# Patient Record
Sex: FEMALE | Race: ASIAN | NOT HISPANIC OR LATINO | Employment: FULL TIME | URBAN - METROPOLITAN AREA
[De-identification: names, ages, dates, MRNs, and addresses within clinical notes are randomized per-mention and may not be internally consistent; named-entity substitution may affect disease eponyms.]

---

## 2019-09-06 ENCOUNTER — OFFICE VISIT - HEALTHEAST (OUTPATIENT)
Dept: SURGERY | Facility: CLINIC | Age: 34
End: 2019-09-06

## 2019-09-06 ENCOUNTER — AMBULATORY - HEALTHEAST (OUTPATIENT)
Dept: LAB | Facility: CLINIC | Age: 34
End: 2019-09-06

## 2019-09-06 DIAGNOSIS — L83 ACANTHOSIS NIGRICANS: ICD-10-CM

## 2019-09-06 DIAGNOSIS — N92.6 MENSTRUAL DISORDER: ICD-10-CM

## 2019-09-06 DIAGNOSIS — E88.819 INSULIN RESISTANCE: ICD-10-CM

## 2019-09-06 DIAGNOSIS — E28.2 POLYCYSTIC OVARY SYNDROME: ICD-10-CM

## 2019-09-06 DIAGNOSIS — E66.9 OBESITY (BMI 30-39.9): ICD-10-CM

## 2019-09-06 DIAGNOSIS — M79.673 PAIN OF FOOT, UNSPECIFIED LATERALITY: ICD-10-CM

## 2019-09-06 LAB
CHOLEST SERPL-MCNC: 248 MG/DL
ERYTHROCYTE [DISTWIDTH] IN BLOOD BY AUTOMATED COUNT: 12.5 % (ref 11–14.5)
FASTING STATUS PATIENT QL REPORTED: NO
FERRITIN SERPL-MCNC: 37 NG/ML (ref 10–130)
FOLATE SERPL-MCNC: 13.8 NG/ML
HCT VFR BLD AUTO: 38.2 % (ref 35–47)
HDLC SERPL-MCNC: 64 MG/DL
HGB BLD-MCNC: 12.6 G/DL (ref 12–16)
LDLC SERPL CALC-MCNC: 158 MG/DL
MCH RBC QN AUTO: 29.6 PG (ref 27–34)
MCHC RBC AUTO-ENTMCNC: 33 G/DL (ref 32–36)
MCV RBC AUTO: 90 FL (ref 80–100)
PLATELET # BLD AUTO: 294 THOU/UL (ref 140–440)
PMV BLD AUTO: 9.2 FL (ref 8.5–12.5)
PTH-INTACT SERPL-MCNC: 56 PG/ML (ref 10–86)
RBC # BLD AUTO: 4.25 MILL/UL (ref 3.8–5.4)
TRIGL SERPL-MCNC: 130 MG/DL
TSH SERPL DL<=0.005 MIU/L-ACNC: 2.06 UIU/ML (ref 0.3–5)
VIT B12 SERPL-MCNC: 344 PG/ML (ref 213–816)
WBC: 7.9 THOU/UL (ref 4–11)

## 2019-09-06 RX ORDER — LETROZOLE 2.5 MG/1
2 TABLET, FILM COATED ORAL DAILY
Refills: 2 | Status: ON HOLD | COMMUNITY
Start: 2019-09-04 | End: 2022-04-24

## 2019-09-06 ASSESSMENT — MIFFLIN-ST. JEOR: SCORE: 1489.88

## 2019-09-08 LAB — HBA1C MFR BLD: 6 % (ref 4.2–6.1)

## 2019-09-09 ENCOUNTER — OFFICE VISIT - HEALTHEAST (OUTPATIENT)
Dept: SURGERY | Facility: CLINIC | Age: 34
End: 2019-09-09

## 2019-09-09 DIAGNOSIS — E88.819 INSULIN RESISTANCE: ICD-10-CM

## 2019-09-09 DIAGNOSIS — Z71.3 NUTRITIONAL COUNSELING: ICD-10-CM

## 2019-09-09 DIAGNOSIS — E66.9 OBESITY WITH BODY MASS INDEX OF 30.0-39.9: ICD-10-CM

## 2019-09-09 LAB — 25(OH)D3 SERPL-MCNC: 34.7 NG/ML (ref 30–80)

## 2019-09-09 ASSESSMENT — MIFFLIN-ST. JEOR: SCORE: 1471.74

## 2019-09-11 ENCOUNTER — COMMUNICATION - HEALTHEAST (OUTPATIENT)
Dept: SURGERY | Facility: CLINIC | Age: 34
End: 2019-09-11

## 2020-01-14 ENCOUNTER — COMMUNICATION - HEALTHEAST (OUTPATIENT)
Dept: SURGERY | Facility: CLINIC | Age: 35
End: 2020-01-14

## 2020-01-14 ENCOUNTER — AMBULATORY - HEALTHEAST (OUTPATIENT)
Dept: SURGERY | Facility: CLINIC | Age: 35
End: 2020-01-14

## 2020-01-14 DIAGNOSIS — E28.2 POLYCYSTIC OVARY SYNDROME: ICD-10-CM

## 2020-01-14 DIAGNOSIS — E66.9 OBESITY (BMI 30-39.9): ICD-10-CM

## 2020-01-14 DIAGNOSIS — E88.819 INSULIN RESISTANCE: ICD-10-CM

## 2020-01-15 ENCOUNTER — AMBULATORY - HEALTHEAST (OUTPATIENT)
Dept: SURGERY | Facility: CLINIC | Age: 35
End: 2020-01-15

## 2020-01-15 DIAGNOSIS — E28.2 PCOS (POLYCYSTIC OVARIAN SYNDROME): ICD-10-CM

## 2020-01-15 DIAGNOSIS — E88.819 INSULIN RESISTANCE: ICD-10-CM

## 2020-07-30 ENCOUNTER — COMMUNICATION - HEALTHEAST (OUTPATIENT)
Dept: SURGERY | Facility: CLINIC | Age: 35
End: 2020-07-30

## 2020-07-30 ENCOUNTER — AMBULATORY - HEALTHEAST (OUTPATIENT)
Dept: SURGERY | Facility: CLINIC | Age: 35
End: 2020-07-30

## 2020-07-30 ENCOUNTER — COMMUNICATION - HEALTHEAST (OUTPATIENT)
Dept: PHARMACY | Facility: CLINIC | Age: 35
End: 2020-07-30

## 2020-07-30 DIAGNOSIS — E28.2 POLYCYSTIC OVARY SYNDROME: ICD-10-CM

## 2020-07-30 DIAGNOSIS — E66.9 OBESITY (BMI 30-39.9): ICD-10-CM

## 2020-07-30 DIAGNOSIS — E88.819 INSULIN RESISTANCE: ICD-10-CM

## 2020-07-30 RX ORDER — PHENTERMINE HYDROCHLORIDE 37.5 MG/1
TABLET ORAL
Qty: 90 TABLET | Refills: 1 | Status: SHIPPED | OUTPATIENT
Start: 2020-07-30 | End: 2022-05-25

## 2021-05-27 VITALS — SYSTOLIC BLOOD PRESSURE: 115 MMHG | DIASTOLIC BLOOD PRESSURE: 81 MMHG

## 2021-06-01 NOTE — PATIENT INSTRUCTIONS - HE
[facility]    Remember to:    -Eat 3 meals a day (not 2, not 5) Chew your food well/SLOW down  -Eat your protein first  -Be a water drinker/Minize liquid calories (no regular pop, no juice) skim or 1% milk OK  -Sleep 7-8 hours each night. Address sleep if problematic  -Stress management is important. Address if problematic  -Move-8000 steps daily Muscle: maintain your muscle mass (strength training 2X/wk)  -Wheat, not white (bread, pasta, crackers, jennifer, bagels, tortillas, rice)  -Limit restaurant, cafeteria, take out, drive through to 2 times per week or less  -Minimize caffeine, alcohol, and night-time snacking  -Consider keeping a food diary (i.e. My Fitness Pal, Lose It, or other food tracker)  -Follow up with the dietitian      **Some lean proteins: chicken, turkey, tuna, salmon, crab, fish, shrimp, scallops, lobster, lean cuts of beef and pork, luncheon meats, veggie burgers, beans (black, lima, garbanzo, espinoza, kidney, refried), chile, cottage cheese, string cheese, other cheese, eggs, tofu, peanut butter, nuts, vegan crumbles, greek yogurt    Goals: No pop NO juice   Will work on decreasing eating out   Will work on increasing activity on non-work days    MEDICATIONS FOR WEIGHT LOSS    PHENTERMINE (Adipex): approved in 1959 for appetite suppression.  It has stimulant effects and cannot be used with Ritalin, Concerta, or other stimulants.  It is not addictive although it's chemically related to amphetamines.  Amphetamines are addictive. The most common side effects are dry mouth, increased energy and concentration, increased pulse, and constipation.  You should not take phentermine if you have glaucoma, hyperthyroidism, or uncontrolled/untreated hypertension.  $24-$30 for 90 tablets    PHENDIMETRAZINE (Bontril): Appetite suppressant/sympathomimetic.  Controlled substance.  Side effects and contraindications similar to phentermine.  $45-$60 for 3 month supply    TOPIRAMATE (Topamax):  Anti-seizure medication, also used to prevent migraines.  Side effects include paresthesia, glaucoma, altered concentration, attention difficulties, memory and speech problems, metabolic acidosis, depression, increase in body temperature and decrease sweating, kidney stones, and weight loss.  Do not take Topamax while taking Depakote as this can cause high ammonia levels.  You must have reliable birth control as Topamax can cause birth defects.  Discontinue slowly to avoid seizure.  Insurance usually covers Topiramate.    QSYMIA (Phentermine + Topamax):  See above information about phentermine and Topamax.  Most common side effects are paresthesia, dizziness, distortion of taste, insomnia, constipation, and dry mouth.  $150-$220 per month    LORCASERIN (Belviq):  Approved in 2012. It is a serotonin 2C receptor agonist which reduces appetite and promotes satiety.  Average weight loss in 6000 patients was 3-3.7% over placebo.  Most common side effects include headache, dizziness, fatigue, nausea, dry mouth, and constipation. Lorcaserin should be discontinued after 12 weeks if the patient does not lose 5% of their body weight.  Use caution in patients on SSRIs, triptans, bupropion, and tramadol.  $120-$215 per month    DIETHYLPROPRION (Tenuate): Sympathomimetic amine.  Appetite suppressant.  Doses 25 mg before meals or 75 mg per day.  Most common side effects are hypertension, palpitations, EKG changes, and increased seizures in epileptics.  There can be a possible adverse reaction with alcohol.  $70-$90 per 3 months    XENICAL(Orlistat) (Benny OTC): Approved in 1999.  A fat-blocker.  It reduces absorption of fat by approximately 30%.  It has beneficial effects on lipid levels.  Side effects include diarrhea, abdominal cramping, fecal incontinence, oily spotting, and flatus with discharge.  Side effects are minimized if the patient limits their dietary fat to no more than 30% of their diet.  Patients must take a  multivitamin daily to avoid vitamin D, E, A, and K deficiency.  $120 per month      CONTRAVE (naltrexone/buproprion): Approved in 2014.  It is a combination pill including an opioid receptor blocker and a long-standing antidepressant.  Most common side effects include nausea, constipation, headache, vomiting, dizziness, trouble sleeping, dry mouth, and diarrhea.  With all antidepressants watch for mood changes and suicide ideation.  Bupropion has been known to lower the seizure threshold in those prone to seizures.  It should not be used in a patient with a recent history of bulimia. It has been associated with liver damage from taking higher than recommended doses.  Do not use countrave if you have taken opioid medications or opioid street drugs in the past 7-10 days, if you are currently on opioids, methadone, or if you are pregnant.  Do not use contrave if you have recently stopped using alcohol or benzodiazepines.  Taper off contrave slowly.  Dosing: titrate up to 2 tablets twice daily of the Naltrexone 8 mg/ Buproprion 90 mg tablets.  $200 for 90 tablets    SAXENDA (Liraglutide): A daily injectable (3mg daily) medication used for type 2 diabetes. Glucagon-like peptide-1 (GLP-1) agonist. Contraindications include personal or family history of medullary thyroid cancer or MEN type 2. Acute pancreatitis has been observed in patients taking liraglutide. Liraglutide causes C-cell tumors in rats and mice. It is unknown whether liraglutide causes tumors in humans. Start at 0.6mg, increasing the dose weekly up to 3mg.     VYVANSE (Lisdexamfetamine dimesylate): a CNS stimulant used to treat ADHD. Indicated for the treatment of moderate to severe Binge Eating Disorder in Adults. Contraindicated in patients with known heart disease, structural abnormalities of the heart, serious heart arrhythmias or unexplained syncope. CNS stimulants such as vyvanse may cause manic or psychotic symptoms in patients with BPAD or  pre-existing psychosis. Use with caution in patients with Raynaud's phenomenon. Most common side effects include dry mouth, insomnia, decreased appetite, increased heart rate, jittery feeling, constipation and anxiety.

## 2021-06-01 NOTE — PROGRESS NOTES
Medical  Weight Loss Initial Diet Evaluation  Jacinta is presenting today for a new weight management nutrition consultation. Pt has had an initial appointment with Dr. Vo.  Pt desires weight loss to improve health in order to become pregnant. She works as a , which results in increased eating of fried foods and lack of meal time schedule.   Weight loss medication: Phentermine. Taking 1/2 tablet; feels light headed. BP taken at today's visit.   Weight Loss Goal: 145 lb   Nutrition Assessment:   Anthropometrics:  Pt's Initial Weight: 186 lbs  Weight: 182 lb (82.6 kg)  Weight loss from initial: 4  % Weight loss: 2.15 %    BMI:   Vitals:    09/09/19 1300   Weight: 182 lb (82.6 kg)      IBW: 111 lb   Estimated RMR ([place]-St Jeor equation): 1476 kcals   Recommended Protein Intake: 60-80 grams of protein/day  Medical History:     Patient Active Problem List   Diagnosis     Infertility associated with anovulation     Complex endometrial hyperplasia with atypia     Cervical cancer screening     Polycystic ovary syndrome     Obesity (BMI 30-39.9)     Menstrual disorder     Acanthosis nigricans     Foot pain     Insulin resistance     Diabetes: No  Nutrition History:   Food allergies: No   Food intolerances/aversions: Yes Beans   Cultural or Worship food customs: No  Weight loss history: 6-8 years ago, lost 10 lb, self imposed diet.   Biggest weight loss struggle per pt: large portions, likes rice, drinks soda/juice.    Vitamins/Mineral Supplementation: not discussed.   Dietary Recall:  Wake up time: 9:30am (when works), if not working, wakes up at 11am.   Breakfast: none   Snack:none   Lunch:1-2pm- rice,4 sausage breakfast links (20g)   Snack: ice cream/ chips   Dinner:8pm- minced chicken with lettuce wraps (20g)   Snack:  Ice cream, junk food   Overnight eating: No  Eating out (frequency/week): daily   Hydration (type/oz. per day):  Water: 16-48oz   Caffeine: coffee every other day   Carbonation:  fountain beverage coke   Juice: 16 oz/day   Alcohol : 2-3x/week   Exercise:  Routine exercise established: No     Nutrition Diagnosis (PES statement):   1. (NI-1.3)Excessive energy intake related to Food and nutrition related knowledge deficit concerning excessive energy/oral intake as evidenced by Intake of high caloric density foods/beverages (juice, soda) at meals and/or snacks; large portion; frequent grazing; Estimated intake that exceeds estimated daily energy intake;  Frequent excessive fast food or restaurant intake; and BMI 32.   Nutrition Intervention:  1. Discussed with patient how to build a meal: the importance of including a lean/low fat protein at each meal, include a source of vegetables at a minimum of lunch and dinner, and limiting carbohydrate intake to 1 serving (15 grams) per meal.  2. Placed emphasis on importance of developing a healthy eating routine, aiming for 3 meals a day and no snacks.   3. Educated on sources of lean protein, portion sizes, and the amount of grams found in each source. Recommend pt to aim for 20-30 grams of protein at each meal; 60-80 grams per day.  4. Discussed using a protein supplement as a meal replacement; provided product examples.   5.  Gave pt a meal planner chart to complete for follow up appointment.     Goals established by patient:   1. Develop meal times.  2. Work on protein intake per meal.   3. Keep food journal.   Handouts provided:  Plate Method  Meal Planner  List of Lean Protein Sources    Nutrition Monitoring/Evaluation:   Follow up:  Pt will follow up in 1 month(s) with bariatrician and 1 month(s) with dietitian.   Time spent with patient: 30 minutes  Svetlana Haney RD   ABN: Yes

## 2021-06-03 VITALS
HEIGHT: 63 IN | WEIGHT: 182 LBS | SYSTOLIC BLOOD PRESSURE: 110 MMHG | DIASTOLIC BLOOD PRESSURE: 70 MMHG | BODY MASS INDEX: 32.25 KG/M2

## 2021-06-03 VITALS
RESPIRATION RATE: 16 BRPM | HEIGHT: 63 IN | WEIGHT: 186 LBS | OXYGEN SATURATION: 100 % | SYSTOLIC BLOOD PRESSURE: 112 MMHG | DIASTOLIC BLOOD PRESSURE: 73 MMHG | BODY MASS INDEX: 32.96 KG/M2 | HEART RATE: 84 BPM

## 2021-06-16 PROBLEM — N97.0 INFERTILITY ASSOCIATED WITH ANOVULATION: Status: ACTIVE | Noted: 2018-09-13

## 2021-06-16 PROBLEM — E88.810 METABOLIC SYNDROME X: Status: ACTIVE | Noted: 2019-09-11

## 2021-06-16 PROBLEM — R73.03 PRE-DIABETES: Status: ACTIVE | Noted: 2019-09-11

## 2021-06-16 PROBLEM — Z12.4 CERVICAL CANCER SCREENING: Status: ACTIVE | Noted: 2017-11-02

## 2021-06-16 PROBLEM — E78.5 DYSLIPIDEMIA: Status: ACTIVE | Noted: 2019-09-11

## 2021-06-16 PROBLEM — N85.02 COMPLEX ENDOMETRIAL HYPERPLASIA WITH ATYPIA: Status: ACTIVE | Noted: 2018-11-01

## 2021-06-27 ENCOUNTER — HEALTH MAINTENANCE LETTER (OUTPATIENT)
Age: 36
End: 2021-06-27

## 2021-06-28 NOTE — PROGRESS NOTES
Progress Notes by Ophelia Fernandez MD at 9/6/2019  2:00 PM     Author: Ophelia Fernandez MD Service: -- Author Type: Physician    Filed: 9/13/2019 12:42 PM Encounter Date: 9/6/2019 Status: Signed    : Ophelia Fernandez MD (Physician)       BARIATRIC CONSULTATION    Impression: Jacinta Aguayo is a 34 y.o. year old female with  has a past medical history of Acanthosis nigricans, Dyslipidemia (9/11/2019), Foot pain, Insulin resistance, Menstrual disorder, Metabolic syndrome X (9/11/2019), Obesity (BMI 30-39.9), Polycystic ovary syndrome, and Pre-diabetes (9/11/2019).  Poor functional capacity and musculoskeletal disability in the setting of the abovementioned weight related co-morbidities. Her Body mass index is 33.48 kg/m ..    Plan:Goals: No pop NO juice   Will work on decreasing eating out   Will work on increasing activity on non-work days   Discussed insulin resistance and ways to manage.  We discussed [facility] including:  -eating 3 meals daily  -eating protein first  -eating slowly, chewing food well  -avoiding/limiting calorie containing beverages  -choosing wheat, not white with breads, crackers, pastas, jennifer, bagels, tortillas, rice  -limiting restaurant or cafeteria eating to twice a week or less    We discussed the importance of restorative sleep and stress management in maintaining a healthy weight.    We reviewed medications associated with weight gain.    We discussed insulin resistance and glycemic index as it relates to appetite and weight control.     We discussed the National Weight Control Registry healthy weight maintenance strategies and ways to optimize metabolism.  We discussed the importance of physical activity including cardiovascular and strength training in maintaining a healthier weight and explored viable options.    We discussed medications available for weight loss including Phentermine, Phendimetrazine, Topamax, Qsymia, Lorcaserin, Diethylproprion,  "Orlistat, Contrave, Saxenda, and Vyvanse. We discussed the risks and benefits of each. We discussed indications, contraindications, potential side effects, and estimated costs of each. Literature was provided. Jacinta understands that not using a weight loss medication is an option.   60 minutes spent with patient. >50% in counseling.      History Surrounding Consultation  Struggles with weight started at a young age  Her weight at age 17 was 120#  She has had several past supervised and unsupervised weight loss attempts  The most weight lost was: 10# becoming vegetarian  Unfortunately there was not durable weight maintenance.  History of bulimia, anorexia, or binge eating disorder? no  If Present has eating disorder been in remission at least 3 years? NA  Night time eating? no    Dietary History  Meals per day: 2  Snacks: yes  Typical Snack: chips, cookies, beef jerkey  Who does the grocery shopping? She does  Who does the cooking? She does  A typical meal includes: B: sausage, eggs, rice L: rice and fried or baked protein D: lasagne  Regular Pop: 2 cans/d on average  Juice: simply orange with chantelle,   Caffeine: few times a week sweet ones Small  Amount of restaurant eating per week: 6  Eating a the table with the TV off? At sofa    Physical Activity Patterns  Current physical activity routine includes: used to bike and ski-2 years ago    Limitations from being physically active on a regular basis includes: fatigue    She describes her general health as: poor    Past Medical History  HTN: no  Dyslipidemia: ?  SHERLEY: no  Obesity Hypoventilation: NO  DM2: no DM1: no DX: no Most recent AIC: NA  Neuropathy: right CTS  Nephropathy: no  Retinopathy: no  IFG or \"pre-DM\": no  MI: no  CVA:no  CHF: no  Heart Valves: native  Previous cardiac testing includes: no  Cancers: no  Kidney Disease: no  DVT: no  PE: no  Colitis: no  Crohn's: no  IBS: no  PUD: no  Fatty Liver: no  Abnormal LFTs: no  Hepatitis: no  Asthma: no  Bronchitis: " no  Pneumonia: no  Other Lung Problems: no  Back Pain:yes  DDD: no  Gout: no  Fibromyalgia: no  USI: no  Severe Headaches: no  Seizures: no If so, last seizure: no  Pseudotumor: no  PCOS: yes  Menstrual Irregularity: yes  Menorrhagia: occ  Infertility: yes  Thyroid problems: no  Thyroid medications: no  Glaucoma: no  HIV positive: NO  MRSA/VRE history: no  History of Blood transfusion: no  Anemia: no    Health Care Maintenance  Colonoscopy: NA  Mammogram: NA  Pap: UTD    Medications   Current Outpatient Medications   Medication Sig Dispense Refill   ? letrozole (FEMARA) 2.5 mg tablet Take 2 tablets by mouth daily.  2   ? phentermine (ADIPEX-P) 37.5 mg tablet Take 1/2 to 1 tablet in the morning. 90 tablet 0     No current facility-administered medications for this visit.      Allergies   Patient has no known allergies.  Past Surgical History  History reviewed. No pertinent surgical history.  History of problems with anesthesia: no  History of Malignant Hyperthermia: NO    Gynecological History  Menarche: 11  Regular: for 1 year  Currently: none without meds  Problems getting pregnant: yes  MD Involvement: yes If so, explanation/Diagnosis: PCOS  : 0  Para: 0  C-S: 0  Vaginal deliveries: 0  SAB:0  EAB: 0  Gestational DM: 0  Gestational HTN: 0  Preeclampsia: 0  Current Birth Control: 0    Family History  family history is not on file.    Social History  Status:   Children: none  Work Status: working [employer]      Addiction History  Smoking History:   Started smoking: age 20's Quit smoking: early 30's Total years of tobacco use: 5 yrs  Alcohol use: rare  Current or Past history of alcohol or substance abuse: no  Last used: NA  Chemical Dependency Treatment History: NA  Chemicals: NA    Psychiatric History  Diagnoses: no  Treated by: NA  Psychiatric Hospitalizations: NA  Suicide attempts: NA  ECT: NA  Panic attacks: NA  History of Abuse: NA    Palliative Medicine History  Involvement in a pain clinic:  "No    ROS  Sleep  Snoring: no  PND: no  Witnessed Apneas: no  [place]:   STOP BANG: 3/8  General  Fatigue: yes  Sleep Quality:OK maybe 6-8 hours  HEENT  Visual changes: no  Gastrointestinal  Heartburn: no  Dysphagia: no  Cardiovascular  Murmur: no  Elevated BP: no  Chest Pain with Exertion: no  Dyspnea with Exertion: yes  Palpitations: no  Lower Extremity Edema: no  Syncope: no  Pulmonary  Shortness of breath at rest: no  Snoring: yes  PND: no  Wheezing: no  CPAP use: no  Gastrointestinal  Trouble swallowing:no  Heartburn: no  HX UGI/EGD: no  Abdominal pain: no  Hematochezia: no  Urologic  Hesitancy: no  Urgency: no  Genitourinary  ED: NA  Menorrhagia: no  Dysmenorrhea: no  Neurologic  Severe headache:no  Paresthesias: right hand  Psychiatric  Moods Stable: yes  Hallucinations: no  Rheumatologic  Myalgias: yes  Arthralgias: yes  Endocrine  Polydipsia: no  Polyuria: no  Galactorrhea: no  Heat intolerance: yes  Hirsutism: yes  Musculoskeletal  Joint pain;yes  Falls: no  Use of cane, crutch or motorized scooter: no  Hematologic  Abnormal Bleeding or Clotting: no  Dermatologic  Skin Tags: yes  Striae: no  F[place]uncless: no  Acne: no  Intertrigo: no  Lower Leg ulcers: no      Physical Exam  Height: 5' 2.5\" (1.588 m) (9/9/2019  1:00 PM)  Initial Weight: 186 lbs (9/9/2019  1:00 PM)  Weight: 182 lb (82.6 kg) (9/9/2019  1:00 PM)  Weight loss from initial: 4 (9/9/2019  1:00 PM)  % Weight loss: 2.15 % (9/9/2019  1:00 PM)  BMI (Calculated): 32.7 (9/9/2019  1:00 PM)  SpO2: 100 % (9/6/2019  1:51 PM)  Waist Circumference (In): 42 Inches (9/6/2019  1:51 PM)  Hip Circumference (In): 44 Inches (9/6/2019  1:51 PM)  Neck Circumference (In): 14.5 Inches (9/6/2019  1:51 PM)  Body fat percentage: 37.8 (9/6/2019  1:51 PM)        General Appearance  No acute distress. Obesity: central  Alert: yes  Sleepy: no  HEENT  PERRLA, EOMI  Neck  Stout: 14.5 No carotid bruits  Airway: 2-3+  Cardiovascular  Rhythm regular Rate Regular  Murmur: " no  Pulmonary  [place] Score: 10  Lungs clear to ascultation  Abdomen  No rashes.   Post surgical Scars: no  Extremities:  Pitting edema: no  Palpable distal pulses: 2+  Varicose veins: no  Neurologic  Tremors: no  Psychiatric  Thought Content Organized  Mood appears stable  Endocrine  Moon Facies: NO  Dorsal Thoracic Prominence: NO  Skin tags: yes  Acanthosis nigricans: yes  Dermatologic  Intertrigo: no            Total time with patient 60 minutes, >50% in counseling and coordination of care.

## 2021-10-17 ENCOUNTER — HEALTH MAINTENANCE LETTER (OUTPATIENT)
Age: 36
End: 2021-10-17

## 2022-02-28 ENCOUNTER — MEDICAL CORRESPONDENCE (OUTPATIENT)
Dept: HEALTH INFORMATION MANAGEMENT | Facility: CLINIC | Age: 37
End: 2022-02-28
Payer: COMMERCIAL

## 2022-03-07 ENCOUNTER — TRANSFERRED RECORDS (OUTPATIENT)
Dept: HEALTH INFORMATION MANAGEMENT | Facility: CLINIC | Age: 37
End: 2022-03-07
Payer: COMMERCIAL

## 2022-03-16 ENCOUNTER — MEDICAL CORRESPONDENCE (OUTPATIENT)
Dept: HEALTH INFORMATION MANAGEMENT | Facility: CLINIC | Age: 37
End: 2022-03-16
Payer: COMMERCIAL

## 2022-03-17 ENCOUNTER — TRANSCRIBE ORDERS (OUTPATIENT)
Dept: MATERNAL FETAL MEDICINE | Facility: HOSPITAL | Age: 37
End: 2022-03-17
Payer: COMMERCIAL

## 2022-03-17 DIAGNOSIS — O26.90 PREGNANCY RELATED CONDITION, ANTEPARTUM: Primary | ICD-10-CM

## 2022-03-31 ENCOUNTER — PRE VISIT (OUTPATIENT)
Dept: MATERNAL FETAL MEDICINE | Facility: HOSPITAL | Age: 37
End: 2022-03-31
Payer: COMMERCIAL

## 2022-04-06 ENCOUNTER — OFFICE VISIT (OUTPATIENT)
Dept: MATERNAL FETAL MEDICINE | Facility: HOSPITAL | Age: 37
End: 2022-04-06
Attending: OBSTETRICS & GYNECOLOGY
Payer: COMMERCIAL

## 2022-04-06 ENCOUNTER — ANCILLARY PROCEDURE (OUTPATIENT)
Dept: ULTRASOUND IMAGING | Facility: HOSPITAL | Age: 37
End: 2022-04-06
Attending: OBSTETRICS & GYNECOLOGY
Payer: COMMERCIAL

## 2022-04-06 DIAGNOSIS — O26.90 PREGNANCY RELATED CONDITION, ANTEPARTUM: ICD-10-CM

## 2022-04-06 DIAGNOSIS — O30.042 DICHORIONIC DIAMNIOTIC TWIN PREGNANCY IN SECOND TRIMESTER: Primary | ICD-10-CM

## 2022-04-06 DIAGNOSIS — O09.812 PREGNANCY RESULTING FROM IN VITRO FERTILIZATION IN SECOND TRIMESTER: ICD-10-CM

## 2022-04-06 DIAGNOSIS — O26.872 SHORT CERVICAL LENGTH DURING PREGNANCY IN SECOND TRIMESTER: ICD-10-CM

## 2022-04-06 DIAGNOSIS — O09.522 MULTIGRAVIDA OF ADVANCED MATERNAL AGE IN SECOND TRIMESTER: ICD-10-CM

## 2022-04-06 PROCEDURE — 99207 PR NO CHARGE LOS: CPT | Performed by: OBSTETRICS & GYNECOLOGY

## 2022-04-06 PROCEDURE — 76812 OB US DETAILED ADDL FETUS: CPT

## 2022-04-06 PROCEDURE — 76811 OB US DETAILED SNGL FETUS: CPT | Mod: 26 | Performed by: OBSTETRICS & GYNECOLOGY

## 2022-04-06 PROCEDURE — 76812 OB US DETAILED ADDL FETUS: CPT | Mod: 26 | Performed by: OBSTETRICS & GYNECOLOGY

## 2022-04-06 PROCEDURE — 76817 TRANSVAGINAL US OBSTETRIC: CPT | Mod: 26 | Performed by: OBSTETRICS & GYNECOLOGY

## 2022-04-06 NOTE — PROGRESS NOTES
"Please see \"Imaging\" tab under Chart Review for full details.    Svetlana Brooke MD  Maternal Fetal Medicine    "

## 2022-04-18 ENCOUNTER — OFFICE VISIT (OUTPATIENT)
Dept: MATERNAL FETAL MEDICINE | Facility: HOSPITAL | Age: 37
End: 2022-04-18
Attending: OBSTETRICS & GYNECOLOGY
Payer: COMMERCIAL

## 2022-04-18 ENCOUNTER — ANCILLARY PROCEDURE (OUTPATIENT)
Dept: ULTRASOUND IMAGING | Facility: HOSPITAL | Age: 37
End: 2022-04-18
Attending: OBSTETRICS & GYNECOLOGY
Payer: COMMERCIAL

## 2022-04-18 DIAGNOSIS — O30.042 DICHORIONIC DIAMNIOTIC TWIN PREGNANCY IN SECOND TRIMESTER: ICD-10-CM

## 2022-04-18 DIAGNOSIS — O09.812 PREGNANCY RESULTING FROM IN VITRO FERTILIZATION IN SECOND TRIMESTER: ICD-10-CM

## 2022-04-18 DIAGNOSIS — O09.522 MULTIGRAVIDA OF ADVANCED MATERNAL AGE IN SECOND TRIMESTER: ICD-10-CM

## 2022-04-18 DIAGNOSIS — O26.872 SHORT CERVICAL LENGTH DURING PREGNANCY IN SECOND TRIMESTER: Primary | ICD-10-CM

## 2022-04-18 DIAGNOSIS — O26.872 SHORT CERVICAL LENGTH DURING PREGNANCY IN SECOND TRIMESTER: ICD-10-CM

## 2022-04-18 PROCEDURE — 76817 TRANSVAGINAL US OBSTETRIC: CPT

## 2022-04-18 PROCEDURE — 99207 PR NO CHARGE LOS: CPT | Performed by: OBSTETRICS & GYNECOLOGY

## 2022-04-18 PROCEDURE — 76817 TRANSVAGINAL US OBSTETRIC: CPT | Mod: 26 | Performed by: OBSTETRICS & GYNECOLOGY

## 2022-04-18 NOTE — NURSING NOTE
"Pt stated she has not started vagina progesterone, \"the pharmacy said they did not have it.\" This writer called [facility] in [place] and spoke with Pharmacist, Alphonso. Per pharmacist, progesterone dispensed as suppository not covered by insurance. VORB order given for vaginal progesterone, 200mg per order as Dr. Brooke on 4/6. Reviewed with Dr. Webb. Pharmacy will text patient once order is ready for .     Terrance Mack RN on 4/18/2022 at 3:35 PM      MD requesting monitoring for contractions. Tocometer placed @1552 until 1608, some uterine irritability noted, MD aware. MD checked patient's cervix. Cervix closed per MD. Education given on progesterone use as well as signs and symptoms of labor. Updated primary clinic, Dr RAMYA Carl. They will plan weekly follow up with patient in their clinic.  "

## 2022-04-23 ENCOUNTER — HOSPITAL ENCOUNTER (INPATIENT)
Facility: CLINIC | Age: 37
LOS: 1 days | Discharge: HOME | DRG: 832 | End: 2022-04-24
Attending: OBSTETRICS & GYNECOLOGY | Admitting: OBSTETRICS & GYNECOLOGY
Payer: COMMERCIAL

## 2022-04-23 PROBLEM — O47.00 PRETERM CONTRACTIONS: Status: ACTIVE | Noted: 2022-04-23

## 2022-04-23 LAB
ABO/RH(D): NORMAL
ALBUMIN UR-MCNC: NEGATIVE MG/DL
ANTIBODY SCREEN: NEGATIVE
APPEARANCE UR: CLEAR
BASOPHILS # BLD AUTO: 0 10E3/UL (ref 0–0.2)
BASOPHILS NFR BLD AUTO: 0 %
BILIRUB UR QL STRIP: NEGATIVE
CLUE CELLS: ABNORMAL
COLOR UR AUTO: NORMAL
EOSINOPHIL # BLD AUTO: 0.1 10E3/UL (ref 0–0.7)
EOSINOPHIL NFR BLD AUTO: 0 %
ERYTHROCYTE [DISTWIDTH] IN BLOOD BY AUTOMATED COUNT: 14.4 % (ref 10–15)
GLUCOSE UR STRIP-MCNC: NEGATIVE MG/DL
HCT VFR BLD AUTO: 31.7 % (ref 35–47)
HGB BLD-MCNC: 10.2 G/DL (ref 11.7–15.7)
HGB UR QL STRIP: NEGATIVE
IMM GRANULOCYTES # BLD: 0.1 10E3/UL
IMM GRANULOCYTES NFR BLD: 1 %
KETONES UR STRIP-MCNC: NEGATIVE MG/DL
LEUKOCYTE ESTERASE UR QL STRIP: NEGATIVE
LYMPHOCYTES # BLD AUTO: 3.5 10E3/UL (ref 0.8–5.3)
LYMPHOCYTES NFR BLD AUTO: 26 %
MCH RBC QN AUTO: 26.4 PG (ref 26.5–33)
MCHC RBC AUTO-ENTMCNC: 32.2 G/DL (ref 31.5–36.5)
MCV RBC AUTO: 82 FL (ref 78–100)
MONOCYTES # BLD AUTO: 0.9 10E3/UL (ref 0–1.3)
MONOCYTES NFR BLD AUTO: 7 %
NEUTROPHILS # BLD AUTO: 8.6 10E3/UL (ref 1.6–8.3)
NEUTROPHILS NFR BLD AUTO: 66 %
NITRATE UR QL: NEGATIVE
NRBC # BLD AUTO: 0 10E3/UL
NRBC BLD AUTO-RTO: 0 /100
PH UR STRIP: 6.5 [PH] (ref 5–7)
PLATELET # BLD AUTO: 180 10E3/UL (ref 150–450)
RBC # BLD AUTO: 3.87 10E6/UL (ref 3.8–5.2)
SP GR UR STRIP: 1.01 (ref 1–1.03)
SPECIMEN EXPIRATION DATE: NORMAL
T PALLIDUM AB SER QL: NONREACTIVE
TRICHOMONAS, WET PREP: ABNORMAL
UROBILINOGEN UR STRIP-MCNC: NORMAL MG/DL
WBC # BLD AUTO: 13.2 10E3/UL (ref 4–11)
WBC'S/HIGH POWER FIELD, WET PREP: ABNORMAL
YEAST, WET PREP: ABNORMAL

## 2022-04-23 PROCEDURE — 99221 1ST HOSP IP/OBS SF/LOW 40: CPT | Mod: 25 | Performed by: OBSTETRICS & GYNECOLOGY

## 2022-04-23 PROCEDURE — 87210 SMEAR WET MOUNT SALINE/INK: CPT | Performed by: STUDENT IN AN ORGANIZED HEALTH CARE EDUCATION/TRAINING PROGRAM

## 2022-04-23 PROCEDURE — 86901 BLOOD TYPING SEROLOGIC RH(D): CPT | Performed by: STUDENT IN AN ORGANIZED HEALTH CARE EDUCATION/TRAINING PROGRAM

## 2022-04-23 PROCEDURE — 120N000002 HC R&B MED SURG/OB UMMC

## 2022-04-23 PROCEDURE — 87491 CHLMYD TRACH DNA AMP PROBE: CPT | Performed by: STUDENT IN AN ORGANIZED HEALTH CARE EDUCATION/TRAINING PROGRAM

## 2022-04-23 PROCEDURE — 86780 TREPONEMA PALLIDUM: CPT | Performed by: STUDENT IN AN ORGANIZED HEALTH CARE EDUCATION/TRAINING PROGRAM

## 2022-04-23 PROCEDURE — 36415 COLL VENOUS BLD VENIPUNCTURE: CPT | Performed by: STUDENT IN AN ORGANIZED HEALTH CARE EDUCATION/TRAINING PROGRAM

## 2022-04-23 PROCEDURE — 81003 URINALYSIS AUTO W/O SCOPE: CPT | Performed by: STUDENT IN AN ORGANIZED HEALTH CARE EDUCATION/TRAINING PROGRAM

## 2022-04-23 PROCEDURE — 87653 STREP B DNA AMP PROBE: CPT | Performed by: STUDENT IN AN ORGANIZED HEALTH CARE EDUCATION/TRAINING PROGRAM

## 2022-04-23 PROCEDURE — 85025 COMPLETE CBC W/AUTO DIFF WBC: CPT | Performed by: STUDENT IN AN ORGANIZED HEALTH CARE EDUCATION/TRAINING PROGRAM

## 2022-04-23 PROCEDURE — 59025 FETAL NON-STRESS TEST: CPT | Mod: 26 | Performed by: OBSTETRICS & GYNECOLOGY

## 2022-04-23 PROCEDURE — G0463 HOSPITAL OUTPT CLINIC VISIT: HCPCS

## 2022-04-23 PROCEDURE — 250N000013 HC RX MED GY IP 250 OP 250 PS 637: Performed by: STUDENT IN AN ORGANIZED HEALTH CARE EDUCATION/TRAINING PROGRAM

## 2022-04-23 PROCEDURE — 87591 N.GONORRHOEAE DNA AMP PROB: CPT | Performed by: STUDENT IN AN ORGANIZED HEALTH CARE EDUCATION/TRAINING PROGRAM

## 2022-04-23 PROCEDURE — 250N000011 HC RX IP 250 OP 636: Performed by: OBSTETRICS & GYNECOLOGY

## 2022-04-23 RX ORDER — ONDANSETRON 2 MG/ML
4 INJECTION INTRAMUSCULAR; INTRAVENOUS EVERY 6 HOURS PRN
Status: DISCONTINUED | OUTPATIENT
Start: 2022-04-23 | End: 2022-04-24 | Stop reason: HOSPADM

## 2022-04-23 RX ORDER — PROGESTERONE 200 MG/1
200 CAPSULE ORAL AT BEDTIME
Status: DISCONTINUED | OUTPATIENT
Start: 2022-04-23 | End: 2022-04-23

## 2022-04-23 RX ORDER — PROGESTERONE 200 MG/1
200 CAPSULE ORAL AT BEDTIME
Status: DISCONTINUED | OUTPATIENT
Start: 2022-04-23 | End: 2022-04-24 | Stop reason: HOSPADM

## 2022-04-23 RX ORDER — PROCHLORPERAZINE MALEATE 10 MG
10 TABLET ORAL EVERY 6 HOURS PRN
Status: DISCONTINUED | OUTPATIENT
Start: 2022-04-23 | End: 2022-04-24 | Stop reason: HOSPADM

## 2022-04-23 RX ORDER — METOCLOPRAMIDE HYDROCHLORIDE 5 MG/ML
10 INJECTION INTRAMUSCULAR; INTRAVENOUS EVERY 6 HOURS PRN
Status: DISCONTINUED | OUTPATIENT
Start: 2022-04-23 | End: 2022-04-24 | Stop reason: HOSPADM

## 2022-04-23 RX ORDER — ACETAMINOPHEN 325 MG/1
650 TABLET ORAL EVERY 4 HOURS PRN
Status: DISCONTINUED | OUTPATIENT
Start: 2022-04-23 | End: 2022-04-24 | Stop reason: HOSPADM

## 2022-04-23 RX ORDER — ONDANSETRON 4 MG/1
4 TABLET, ORALLY DISINTEGRATING ORAL EVERY 6 HOURS PRN
Status: DISCONTINUED | OUTPATIENT
Start: 2022-04-23 | End: 2022-04-24 | Stop reason: HOSPADM

## 2022-04-23 RX ORDER — PROCHLORPERAZINE 25 MG
25 SUPPOSITORY, RECTAL RECTAL EVERY 12 HOURS PRN
Status: DISCONTINUED | OUTPATIENT
Start: 2022-04-23 | End: 2022-04-24 | Stop reason: HOSPADM

## 2022-04-23 RX ORDER — HYDROXYZINE HYDROCHLORIDE 50 MG/1
50 TABLET, FILM COATED ORAL
Status: DISCONTINUED | OUTPATIENT
Start: 2022-04-23 | End: 2022-04-24 | Stop reason: HOSPADM

## 2022-04-23 RX ORDER — BETAMETHASONE SODIUM PHOSPHATE AND BETAMETHASONE ACETATE 3; 3 MG/ML; MG/ML
12 INJECTION, SUSPENSION INTRA-ARTICULAR; INTRALESIONAL; INTRAMUSCULAR; SOFT TISSUE EVERY 24 HOURS
Status: COMPLETED | OUTPATIENT
Start: 2022-04-23 | End: 2022-04-24

## 2022-04-23 RX ORDER — METOCLOPRAMIDE 10 MG/1
10 TABLET ORAL EVERY 6 HOURS PRN
Status: DISCONTINUED | OUTPATIENT
Start: 2022-04-23 | End: 2022-04-24 | Stop reason: HOSPADM

## 2022-04-23 RX ADMIN — PROGESTERONE 200 MG: 200 CAPSULE ORAL at 20:36

## 2022-04-23 RX ADMIN — BETAMETHASONE SODIUM PHOSPHATE AND BETAMETHASONE ACETATE 12 MG: 3; 3 INJECTION, SUSPENSION INTRA-ARTICULAR; INTRALESIONAL; INTRAMUSCULAR at 10:26

## 2022-04-23 NOTE — PROVIDER NOTIFICATION
04/23/22 1413   Provider Notification   Provider Name/Title Dr. Webb   Method of Notification In Department   Notification Reason Status Update   Pt denies contractions, toco not tracing contractions. Do you still want pt on continuous Reform? Per Dr. Leon pt to be TID monitoring.

## 2022-04-23 NOTE — DISCHARGE SUMMARY
[facility] Discharge Summary    Jacinta Aguayo MRN# [id number]   Age: 37 year old YOB: 1985     Date of Admission:  2022  Date of Discharge:  22  Admitting Physician:  Clau Webb DO  Discharge Physician:  Clau Cruz     Admission Diagnosis:  - IUP at 25w1d  - Short cervix  - Di di twins  - Conception via IVF    Discharge Diagnosis:  - Same    Procedures:  Fetal and uterine monitoring    Consultations:  None    Medications prior to admission:  Medications Prior to Admission   Medication Sig Dispense Refill Last Dose     progesterone (ENDOMETRIN) 100 MG vaginal insert Place 2 suppositories (200 mg) vaginally At Bedtime 60 suppository 4 2022 at 0030     phentermine (ADIPEX-P) 37.5 mg tablet [PHENTERMINE (ADIPEX-P) 37.5 MG TABLET] Take 1/2 to 1 tablet in the morning. 90 tablet 1      [DISCONTINUED] letrozole (FEMARA) 2.5 mg tablet [LETROZOLE (FEMARA) 2.5 MG TABLET] Take 2 tablets by mouth daily.  2 More than a month at Unknown time     [DISCONTINUED] metFORMIN (GLUCOPHAGE) 500 MG tablet [METFORMIN (GLUCOPHAGE) 500 MG TABLET] Take 1 tablet (500 mg total) by mouth 2 (two) times a day with meals. 180 tablet prn          Brief History of Presentation:    Jacinta Aguayo is a 37 year old  at 25w1d by ETD who presents after she lost her mucus plug.      This happened around midnight. She denies any bleeding or leaking fluid. She reports normal fetal movement. Since losing mucus plug she has noted increased frequency of cramping, now every 15-30 minutes. Prior, she had noticed intermittent cramping for the past few days.      She denies headache, vision changes, chest pain, shortness of breath, fever, chills, nausea, vomiting or other systemic       Hospital Course:  Her cervix was finger tip on presentation. She was placed on continuous tocometry which showed no contractions. Her cervix was reassessed and was unchanged. She did receive BMZ 12mg  IM x2 due to her very high risk of  delivery. Her cervix remained unchanged. She was discharged to home on hospital day 2 with close follow up with her primary OBGYN       Discharge Instructions:  Call or present to labor and delivery if you experience:   -Regular painful contractions concerning for labor   -Leakage of fluid concerning for ruptured membranes   -Decreased fetal movement   -Bright red vaginal bleeding    -Headache, vision changes, upper abdominal pain, significant increase in swelling,   generalized unwell feeling    Follow up:  Follow up with primary OBGYN on  as previously scheduled.      Discharge Medications:  Current Discharge Medication List      CONTINUE these medications which have NOT CHANGED    Details   progesterone (ENDOMETRIN) 100 MG vaginal insert Place 2 suppositories (200 mg) vaginally At Bedtime  Qty: 60 suppository, Refills: 4    Associated Diagnoses: Short cervical length during pregnancy in second trimester      phentermine (ADIPEX-P) 37.5 mg tablet [PHENTERMINE (ADIPEX-P) 37.5 MG TABLET] Take 1/2 to 1 tablet in the morning.  Qty: 90 tablet, Refills: 1    Associated Diagnoses: Polycystic ovary syndrome; Obesity (BMI 30-39.9); Insulin resistance         STOP taking these medications       letrozole (FEMARA) 2.5 mg tablet Comments:   Reason for Stopping:         metFORMIN (GLUCOPHAGE) 500 MG tablet Comments:   Reason for Stopping:               Jailene Barksdale MD  Obstetrics and Gynecology, PGY-3  22 11:33 AM    FACULTY DISCHARGE NOTE:  I saw and examined the patient today.  See findings as documented in today's progress note. I reviewed the discharge plan with the resident, and agree with the final assessment and plan for discharge as noted in the resident's discharge summary. I personally spent 20 minutes today on discharge activities for this patient.    Clau Webb DO [facility]  Maternal Fetal Medicine Specialist  Pager: [phone]  Mobile: [phone]

## 2022-04-23 NOTE — PLAN OF CARE
US removed, continuing to monitor uterine activity with toco. EFM appropriate for gestational age, see flowsheets for details. Awaiting lab results. Patient drinking water and resting comfortably.

## 2022-04-23 NOTE — H&P
Maternal Fetal Medicine History and Physical   2022  Jacinta Aguayo  [id number]      HPI: Jacinta Aguayo is a 37 year old  at 25w1d by ETD who presents after she lost her mucus plug.     This happened around midnight. She denies any bleeding or leaking fluid. She reports normal fetal movement. Since losing mucus plug she has noted increased frequency of cramping, now every 15-30 minutes. Prior, she had noticed intermittent cramping for the past few days.     She denies headache, vision changes, chest pain, shortness of breath, fever, chills, nausea, vomiting or other systemic complaints.    Her pregnancy has been complicated by:  - Conception via IVF  - Di/di twins  - Short cervix  - Obesity    ROS: negative other than noted above    OBHX:   OB History    Para Term  AB Living   1 0 0 0 0 0   SAB IAB Ectopic Multiple Live Births   0 0 0 0 0      # Outcome Date GA Lbr Arnaldo/2nd Weight Sex Delivery Anes PTL Lv   1 Current                History reviewed. No pertinent past medical history.    History reviewed. No pertinent surgical history.    Medications:   No current facility-administered medications on file prior to encounter.  letrozole (FEMARA) 2.5 mg tablet, [LETROZOLE (FEMARA) 2.5 MG TABLET] Take 2 tablets by mouth daily.  progesterone (ENDOMETRIN) 100 MG vaginal insert, Place 2 suppositories (200 mg) vaginally At Bedtime  metFORMIN (GLUCOPHAGE) 500 MG tablet, [METFORMIN (GLUCOPHAGE) 500 MG TABLET] Take 1 tablet (500 mg total) by mouth 2 (two) times a day with meals.  phentermine (ADIPEX-P) 37.5 mg tablet, [PHENTERMINE (ADIPEX-P) 37.5 MG TABLET] Take 1/2 to 1 tablet in the morning.      No Known Allergies    History reviewed. No pertinent family history.    SocialHX:   Social History     Tobacco Use     Smoking status: Former Smoker     Quit date: 2019     Years since quittin.8     Smokeless tobacco: Never Used   Substance Use Topics     Alcohol use: Not Currently     Drug use:  Never       ROS: 10-point ROS negative except as indicated in HPI.    Physical Exam:  Vitals:    22 0350 22 0420   BP: 120/60    Resp:  16   Temp:  98.4  F (36.9  C)   TempSrc:  Oral     General: alert, oriented female, resting in bed in NAD  CV: regular rat, well perufsed  Lungs: non labored breathing, on RA  Abdomen: soft, gravid, non-tender  Extremities: bilateral lower extremities non-tender with +1 edema  SSE: copious white discharge in vagina. No pooling. No blood. Cervix appears closed  SVE: ft/50/-3  Membranes: intact    Presentation: Ceph/breech by BSUS    FHT:  A: baseline 145, moderate variability, occasional 10x10 accelerations, occasional variable decelerations  B: baseline 150, moderate variability, occasional 10x10 accelerations, rare variable  [place]: no contractions    Prenatal ultrasounds:  [facility] US () cephalic/tranverse presentation. Nl MVP x2. 6.8mm cervix.     Prenatal Labs:   Lab Results   Component Value Date    HGB 12.6 2019     GBS Status:   No results found for: GBS    No results found for: PAP    Labs: No results found for this or any previous visit (from the past 24 hour(s)).    Assessment: 37 year old  at 25w1d here for assessment of  labor in the setting of a known short cervix. Pregnancy also complicated by: di/di twins, IVF, obesity.     Plan:   contractions  Short cervix  - Slight cervical change since exam Monday  - Will repeat exam as needed  - If change, consider BMZ, magnesium, PCN, NICU  - Follow up UA, wet prep, GC/CT, GBS  - Continuous toco  - Continue vaginal progesterone    Di di twins  - TID NST    Fetal well-being  - Appropriate for gestational age    Patient seen and care plan discussed under supervision of Dr. Webb.    Jailene Barksdale MD  Obstetrics and Gyncology, PGY-3  2022, 4:29 AM      Physician Attestation   I, Clau Webb, , saw and evaluated Jacinta Aguayo with the resident.  I have reviewed and discussed  with Dr. Barksdale their history, physical and plan.    I personally reviewed the vital signs, medications, labs and imaging.    My key history or physical exam findings:   Di/Di twin gestation via IVF with known short cervix.  Patient has been on vaginal progesterone x 5 days.  Felt increase in mucus discharge early this morning with pink tinge mucus.     Cervix is visually closed, but FT dilated.  No contractions seen.  +FM and reassuring NST for gestational age x 2.     Key management decisions made by me:   -Will administer BMZ given high risk for PTD with di/di twins and short cervix  -Will tocolyze if contractions  - Reevaluate tomorrow and if no change will likely be discharged home after BMZ #2.     Clau Webb DO  Date of Service (when I saw the patient): 04/23/22    Time Spent on this Encounter   I, Clau Webb DO, spent a total of 30 minutes face to face or coordinating care of Jacinta Aguayo.  Over 50% of my time on the unit was spent counseling the patient and/or coordinating care regarding plan for administration of BMZ given high risk for PTD.

## 2022-04-23 NOTE — PLAN OF CARE
VSS. Pt denies contractions, pain, loss of fluid or mucous. EFM AGA x2, see flowsheet. First beta given today at 1030. Spouse at bedside and supportive. Continue plan of care.

## 2022-04-23 NOTE — PROVIDER NOTIFICATION
Patient arrived on unit at 0335 with partner, ambulated to room. VS and assessment WNL. EFM initiated with difficulty keeping both twins continuously on monitor. Dr. Barksdale notified of patient's arrival. Pt reports believing she may have lost her mucus plug around 0030 when she administered vaginal progesterone suppository, and has felt occasional quick pains in lower pelvis about every 15 minutes. Denies LOF, odor to discharge, uterine contractions, s/sx of pre-e. Plan per Dr. Barksdale is to run labs and start NST. Encouraging oral hydration.

## 2022-04-24 VITALS
TEMPERATURE: 98.4 F | SYSTOLIC BLOOD PRESSURE: 126 MMHG | RESPIRATION RATE: 18 BRPM | OXYGEN SATURATION: 97 % | DIASTOLIC BLOOD PRESSURE: 62 MMHG

## 2022-04-24 LAB
C TRACH DNA SPEC QL NAA+PROBE: NEGATIVE
GP B STREP DNA SPEC QL NAA+PROBE: POSITIVE
N GONORRHOEA DNA SPEC QL NAA+PROBE: NEGATIVE

## 2022-04-24 PROCEDURE — 999N000105 HC STATISTIC NO DOCUMENTATION TO SUPPORT CHARGE

## 2022-04-24 PROCEDURE — 59025 FETAL NON-STRESS TEST: CPT | Mod: 59

## 2022-04-24 PROCEDURE — 250N000011 HC RX IP 250 OP 636: Performed by: OBSTETRICS & GYNECOLOGY

## 2022-04-24 PROCEDURE — 59025 FETAL NON-STRESS TEST: CPT | Mod: 76

## 2022-04-24 RX ADMIN — BETAMETHASONE SODIUM PHOSPHATE AND BETAMETHASONE ACETATE 12 MG: 3; 3 INJECTION, SUSPENSION INTRA-ARTICULAR; INTRALESIONAL; INTRAMUSCULAR at 10:45

## 2022-04-24 NOTE — DISCHARGE INSTRUCTIONS
Discharge Instructions for Undelivered Patients    Diet:  * Drink 8 to 12 glasses of liquids (milk, juice, water) every day  * You may eat meals and snacks.    Activity:  * Count fetal kicks every day (see handout).  * Call your doctor or nurse midwife if your baby is moving less than usual.    Call your provider if you notice:  * Swelling in your face or increased swelling in your hands or legs.  * Headaches that are not relieved by Tylenol (acetaminophen).  * Changes in your vision (blurring; seeing spots or stars).  * Nausea (sick to your stomach) and vomiting (throwing up).  * Weight gain of 5 pounds per week.  * Heartburn that doesn't go away.  * Signs of bladder infection: Pain when you urinate (use the toilet), needing to go more often or more urgently.  * The bag of funez (membrane) breaks, or you notice leaking in your underwear.  * Bright red blood in your underwear.  * Abdominal (lower belly) or stomach pain.  * For first baby: Contractions (tightenings) less than 5 minutes apart for one hour or more.  * Increase or change in vaginal discharge (note the color and amount).

## 2022-04-24 NOTE — PROGRESS NOTES
Progress Note    Patient received second dose of BMZ. SVE repeated and unchanged. Plan for discharge to home with close follow up.    Jailene Barksdale MD  Obstetrics and Gynecology, PGY-3  04/24/22

## 2022-04-24 NOTE — PLAN OF CARE
Pts VS stable on this RN's shift. NSTs for baby A and B appropriate for gestational age. Pt does not have any complaints such as no LOF, no bleeding, no signs or symptoms of pre-e, and no contractions. Pts' cervix checked and found to be unchanged this shift, pt was given her second beta shot and she is stable for discharge to home.

## 2022-04-24 NOTE — PROGRESS NOTES
MFM Antepartum Progress Note    S: Patient doing well. Slept well overnight. Denies contractions, cramping or sharp pain. No discharge or vaginal bleeding. Feels babies moving normally.    O: /65 (BP Location: Right arm, Patient Position: Semi-Randhawa's, Cuff Size: Adult Regular)   Temp 98  F (36.7  C) (Oral)   Resp 16   SpO2 97%   Gen:  Resting comfortably in bed, NAD    FHT:   A: Baseline 145, moderate variability, occasional accels, occasional variable decels  B: Baseline 140, moderate variability, occasional accels, occasional variable decels    A/P: Jacinta is a 37 year old  at 25w2d admitted for  labor evaluation in the setting of known short cervix. No contractions or abdominal pain for >24 hours. Pregnancy otherwise complicated by Di/di twins, obesity    Rule out  labor  Short cervix  - Continue vaginal progesterone  - Administered second dose of BMZ this morning  - Repeat SVE this morning. If unchanged, plan discharge to home with close follow up. If change will plan 48 hours of tocolysis    Fetal well being  Di/Di twins  - Appropriate for gestational age x2  - BMZ as above    Patient seen and discussed with Dr. Webb.    Jailene Barksdale MD  Obstetrics and Gynecology, PGY-3  22 9:30 AM      Physician Attestation   IClau DO, saw and evaluated Jacinta Aguayo with the resident.      I personally reviewed the vital signs, medications, labs and imaging.    My key history or physical exam findings:   Patient is doing well.  She slept fine. No cramping or contractions.  Denies pink discharge/spotting.  +FM x 2.      Key management decisions made by me:   Plan to reassess digital cervix examination. If unchanged patient can be discharged after 2nd BMZ.     Follow up with primary ob tomorrow as planned.  Close monitoring for  labor    Clau Webb DO  Date of Service (when I saw the patient): 22    Time Spent on this Encounter   Clau MAGALLANES  Cielo Webb DO, spent a total of 15 minutes face to face or coordinating care of Jacinta Aguayo.  Over 50% of my time on the unit was spent counseling the patient and/or coordinating care regarding Di/Di twin gestation, known short cervix with minimal cervical change.

## 2022-05-08 ENCOUNTER — ANESTHESIA EVENT (OUTPATIENT)
Dept: OBGYN | Facility: CLINIC | Age: 37
End: 2022-05-08
Payer: COMMERCIAL

## 2022-05-08 ENCOUNTER — HOSPITAL ENCOUNTER (INPATIENT)
Facility: CLINIC | Age: 37
LOS: 4 days | Discharge: HOME | End: 2022-05-12
Attending: OBSTETRICS & GYNECOLOGY | Admitting: OBSTETRICS & GYNECOLOGY
Payer: COMMERCIAL

## 2022-05-08 ENCOUNTER — ANESTHESIA (OUTPATIENT)
Dept: OBGYN | Facility: CLINIC | Age: 37
End: 2022-05-08
Payer: COMMERCIAL

## 2022-05-08 DIAGNOSIS — Z98.891 S/P CESAREAN SECTION: Primary | ICD-10-CM

## 2022-05-08 PROBLEM — Z36.89 ENCOUNTER FOR TRIAGE IN PREGNANT PATIENT: Status: ACTIVE | Noted: 2022-05-08

## 2022-05-08 PROBLEM — O42.919 PRETERM PREMATURE RUPTURE OF MEMBRANES (PPROM) WITH UNKNOWN ONSET OF LABOR: Status: ACTIVE | Noted: 2022-05-08

## 2022-05-08 LAB
ABO/RH(D): NORMAL
ALBUMIN UR-MCNC: NEGATIVE MG/DL
AMORPH CRY #/AREA URNS HPF: ABNORMAL /HPF
AMPHETAMINES UR QL SCN: NORMAL
ANTIBODY SCREEN: NEGATIVE
APPEARANCE UR: ABNORMAL
BACTERIA #/AREA URNS HPF: ABNORMAL /HPF
BASOPHILS # BLD AUTO: 0 10E3/UL (ref 0–0.2)
BASOPHILS NFR BLD AUTO: 0 %
BILIRUB UR QL STRIP: NEGATIVE
CANNABINOIDS UR QL SCN: NORMAL
CLUE CELLS: ABNORMAL
COCAINE UR QL: NORMAL
COLOR UR AUTO: ABNORMAL
EOSINOPHIL # BLD AUTO: 0 10E3/UL (ref 0–0.7)
EOSINOPHIL NFR BLD AUTO: 0 %
ERYTHROCYTE [DISTWIDTH] IN BLOOD BY AUTOMATED COUNT: 15.1 % (ref 10–15)
GLUCOSE UR STRIP-MCNC: 30 MG/DL
HCT VFR BLD AUTO: 31 % (ref 35–47)
HGB BLD-MCNC: 10 G/DL (ref 11.7–15.7)
HGB UR QL STRIP: NEGATIVE
IMM GRANULOCYTES # BLD: 0.2 10E3/UL
IMM GRANULOCYTES NFR BLD: 1 %
KETONES UR STRIP-MCNC: NEGATIVE MG/DL
LEUKOCYTE ESTERASE UR QL STRIP: ABNORMAL
LYMPHOCYTES # BLD AUTO: 2.5 10E3/UL (ref 0.8–5.3)
LYMPHOCYTES NFR BLD AUTO: 17 %
MCH RBC QN AUTO: 26.1 PG (ref 26.5–33)
MCHC RBC AUTO-ENTMCNC: 32.3 G/DL (ref 31.5–36.5)
MCV RBC AUTO: 81 FL (ref 78–100)
MONOCYTES # BLD AUTO: 1.2 10E3/UL (ref 0–1.3)
MONOCYTES NFR BLD AUTO: 9 %
MUCOUS THREADS #/AREA URNS LPF: PRESENT /LPF
NEUTROPHILS # BLD AUTO: 10.4 10E3/UL (ref 1.6–8.3)
NEUTROPHILS NFR BLD AUTO: 73 %
NITRATE UR QL: NEGATIVE
NRBC # BLD AUTO: 0 10E3/UL
NRBC BLD AUTO-RTO: 0 /100
OPIATES UR QL SCN: NORMAL
PCP UR QL SCN: NORMAL
PH UR STRIP: 6 [PH] (ref 5–7)
PLATELET # BLD AUTO: 169 10E3/UL (ref 150–450)
RBC # BLD AUTO: 3.83 10E6/UL (ref 3.8–5.2)
RBC URINE: 4 /HPF
RUPTURE OF FETAL MEMBRANES BY ROM PLUS: POSITIVE
SARS-COV-2 RNA RESP QL NAA+PROBE: NEGATIVE
SP GR UR STRIP: 1.01 (ref 1–1.03)
SPECIMEN EXPIRATION DATE: NORMAL
SQUAMOUS EPITHELIAL: 5 /HPF
TRICHOMONAS, WET PREP: ABNORMAL
UROBILINOGEN UR STRIP-MCNC: NORMAL MG/DL
WBC # BLD AUTO: 14.3 10E3/UL (ref 4–11)
WBC URINE: 68 /HPF
WBC'S/HIGH POWER FIELD, WET PREP: ABNORMAL
YEAST, WET PREP: ABNORMAL

## 2022-05-08 PROCEDURE — 86850 RBC ANTIBODY SCREEN: CPT | Performed by: STUDENT IN AN ORGANIZED HEALTH CARE EDUCATION/TRAINING PROGRAM

## 2022-05-08 PROCEDURE — 250N000013 HC RX MED GY IP 250 OP 250 PS 637: Performed by: STUDENT IN AN ORGANIZED HEALTH CARE EDUCATION/TRAINING PROGRAM

## 2022-05-08 PROCEDURE — 87653 STREP B DNA AMP PROBE: CPT | Performed by: STUDENT IN AN ORGANIZED HEALTH CARE EDUCATION/TRAINING PROGRAM

## 2022-05-08 PROCEDURE — G0463 HOSPITAL OUTPT CLINIC VISIT: HCPCS | Mod: 25

## 2022-05-08 PROCEDURE — 99222 1ST HOSP IP/OBS MODERATE 55: CPT | Mod: AI | Performed by: OBSTETRICS & GYNECOLOGY

## 2022-05-08 PROCEDURE — U0003 INFECTIOUS AGENT DETECTION BY NUCLEIC ACID (DNA OR RNA); SEVERE ACUTE RESPIRATORY SYNDROME CORONAVIRUS 2 (SARS-COV-2) (CORONAVIRUS DISEASE [COVID-19]), AMPLIFIED PROBE TECHNIQUE, MAKING USE OF HIGH THROUGHPUT TECHNOLOGIES AS DESCRIBED BY CMS-2020-01-R: HCPCS | Performed by: OBSTETRICS & GYNECOLOGY

## 2022-05-08 PROCEDURE — 87210 SMEAR WET MOUNT SALINE/INK: CPT | Performed by: STUDENT IN AN ORGANIZED HEALTH CARE EDUCATION/TRAINING PROGRAM

## 2022-05-08 PROCEDURE — 80307 DRUG TEST PRSMV CHEM ANLYZR: CPT | Performed by: OBSTETRICS & GYNECOLOGY

## 2022-05-08 PROCEDURE — 85025 COMPLETE CBC W/AUTO DIFF WBC: CPT | Performed by: STUDENT IN AN ORGANIZED HEALTH CARE EDUCATION/TRAINING PROGRAM

## 2022-05-08 PROCEDURE — 120N000002 HC R&B MED SURG/OB UMMC

## 2022-05-08 PROCEDURE — 86901 BLOOD TYPING SEROLOGIC RH(D): CPT | Performed by: STUDENT IN AN ORGANIZED HEALTH CARE EDUCATION/TRAINING PROGRAM

## 2022-05-08 PROCEDURE — 87086 URINE CULTURE/COLONY COUNT: CPT | Performed by: STUDENT IN AN ORGANIZED HEALTH CARE EDUCATION/TRAINING PROGRAM

## 2022-05-08 PROCEDURE — 84112 EVAL AMNIOTIC FLUID PROTEIN: CPT | Performed by: OBSTETRICS & GYNECOLOGY

## 2022-05-08 PROCEDURE — 87491 CHLMYD TRACH DNA AMP PROBE: CPT | Performed by: STUDENT IN AN ORGANIZED HEALTH CARE EDUCATION/TRAINING PROGRAM

## 2022-05-08 PROCEDURE — 250N000011 HC RX IP 250 OP 636: Performed by: STUDENT IN AN ORGANIZED HEALTH CARE EDUCATION/TRAINING PROGRAM

## 2022-05-08 PROCEDURE — 258N000003 HC RX IP 258 OP 636: Performed by: STUDENT IN AN ORGANIZED HEALTH CARE EDUCATION/TRAINING PROGRAM

## 2022-05-08 PROCEDURE — 81001 URINALYSIS AUTO W/SCOPE: CPT | Performed by: STUDENT IN AN ORGANIZED HEALTH CARE EDUCATION/TRAINING PROGRAM

## 2022-05-08 RX ORDER — PROCHLORPERAZINE MALEATE 10 MG
10 TABLET ORAL EVERY 6 HOURS PRN
Status: DISCONTINUED | OUTPATIENT
Start: 2022-05-08 | End: 2022-05-09 | Stop reason: HOSPADM

## 2022-05-08 RX ORDER — SODIUM CHLORIDE, SODIUM LACTATE, POTASSIUM CHLORIDE, CALCIUM CHLORIDE 600; 310; 30; 20 MG/100ML; MG/100ML; MG/100ML; MG/100ML
INJECTION, SOLUTION INTRAVENOUS
Status: DISCONTINUED
Start: 2022-05-08 | End: 2022-05-08 | Stop reason: HOSPADM

## 2022-05-08 RX ORDER — AMOXICILLIN 250 MG
1 CAPSULE ORAL 2 TIMES DAILY
Status: DISCONTINUED | OUTPATIENT
Start: 2022-05-08 | End: 2022-05-09 | Stop reason: HOSPADM

## 2022-05-08 RX ORDER — AMOXICILLIN 250 MG/1
250 CAPSULE ORAL 3 TIMES DAILY
Status: DISCONTINUED | OUTPATIENT
Start: 2022-05-10 | End: 2022-05-09 | Stop reason: HOSPADM

## 2022-05-08 RX ORDER — AMOXICILLIN 250 MG
2 CAPSULE ORAL 2 TIMES DAILY
Status: DISCONTINUED | OUTPATIENT
Start: 2022-05-08 | End: 2022-05-09 | Stop reason: HOSPADM

## 2022-05-08 RX ORDER — SODIUM CHLORIDE, SODIUM LACTATE, POTASSIUM CHLORIDE, CALCIUM CHLORIDE 600; 310; 30; 20 MG/100ML; MG/100ML; MG/100ML; MG/100ML
INJECTION, SOLUTION INTRAVENOUS CONTINUOUS
Status: DISCONTINUED | OUTPATIENT
Start: 2022-05-08 | End: 2022-05-09 | Stop reason: HOSPADM

## 2022-05-08 RX ORDER — AMPICILLIN 2 G/1
2 INJECTION, POWDER, FOR SOLUTION INTRAVENOUS EVERY 6 HOURS
Status: DISCONTINUED | OUTPATIENT
Start: 2022-05-08 | End: 2022-05-09 | Stop reason: HOSPADM

## 2022-05-08 RX ORDER — MAGNESIUM SULFATE IN WATER 40 MG/ML
2 INJECTION, SOLUTION INTRAVENOUS CONTINUOUS
Status: DISCONTINUED | OUTPATIENT
Start: 2022-05-08 | End: 2022-05-09

## 2022-05-08 RX ORDER — PROCHLORPERAZINE 25 MG
25 SUPPOSITORY, RECTAL RECTAL EVERY 12 HOURS PRN
Status: DISCONTINUED | OUTPATIENT
Start: 2022-05-08 | End: 2022-05-09 | Stop reason: HOSPADM

## 2022-05-08 RX ORDER — METOCLOPRAMIDE 10 MG/1
10 TABLET ORAL EVERY 6 HOURS PRN
Status: DISCONTINUED | OUTPATIENT
Start: 2022-05-08 | End: 2022-05-09 | Stop reason: HOSPADM

## 2022-05-08 RX ORDER — BETAMETHASONE SODIUM PHOSPHATE AND BETAMETHASONE ACETATE 3; 3 MG/ML; MG/ML
12 INJECTION, SUSPENSION INTRA-ARTICULAR; INTRALESIONAL; INTRAMUSCULAR; SOFT TISSUE EVERY 24 HOURS
Status: COMPLETED | OUTPATIENT
Start: 2022-05-08 | End: 2022-05-09

## 2022-05-08 RX ORDER — LIDOCAINE 40 MG/G
CREAM TOPICAL
Status: DISCONTINUED | OUTPATIENT
Start: 2022-05-08 | End: 2022-05-08 | Stop reason: HOSPADM

## 2022-05-08 RX ORDER — MAGNESIUM SULFATE HEPTAHYDRATE 40 MG/ML
2 INJECTION, SOLUTION INTRAVENOUS ONCE
Status: COMPLETED | OUTPATIENT
Start: 2022-05-08 | End: 2022-05-09

## 2022-05-08 RX ORDER — ONDANSETRON 4 MG/1
4 TABLET, ORALLY DISINTEGRATING ORAL EVERY 6 HOURS PRN
Status: DISCONTINUED | OUTPATIENT
Start: 2022-05-08 | End: 2022-05-09 | Stop reason: HOSPADM

## 2022-05-08 RX ORDER — ONDANSETRON 2 MG/ML
4 INJECTION INTRAMUSCULAR; INTRAVENOUS EVERY 6 HOURS PRN
Status: DISCONTINUED | OUTPATIENT
Start: 2022-05-08 | End: 2022-05-09 | Stop reason: HOSPADM

## 2022-05-08 RX ORDER — POLYETHYLENE GLYCOL 3350 17 G/17G
17 POWDER, FOR SOLUTION ORAL DAILY
Status: DISCONTINUED | OUTPATIENT
Start: 2022-05-08 | End: 2022-05-09 | Stop reason: HOSPADM

## 2022-05-08 RX ORDER — AZITHROMYCIN 250 MG/1
1000 TABLET, FILM COATED ORAL ONCE
Status: COMPLETED | OUTPATIENT
Start: 2022-05-08 | End: 2022-05-08

## 2022-05-08 RX ORDER — CALCIUM GLUCONATE 94 MG/ML
1 INJECTION, SOLUTION INTRAVENOUS
Status: DISCONTINUED | OUTPATIENT
Start: 2022-05-08 | End: 2022-05-09 | Stop reason: HOSPADM

## 2022-05-08 RX ORDER — METOCLOPRAMIDE HYDROCHLORIDE 5 MG/ML
10 INJECTION INTRAMUSCULAR; INTRAVENOUS EVERY 6 HOURS PRN
Status: DISCONTINUED | OUTPATIENT
Start: 2022-05-08 | End: 2022-05-09 | Stop reason: HOSPADM

## 2022-05-08 RX ORDER — HYDROXYZINE HYDROCHLORIDE 50 MG/1
50 TABLET, FILM COATED ORAL
Status: DISCONTINUED | OUTPATIENT
Start: 2022-05-08 | End: 2022-05-09 | Stop reason: HOSPADM

## 2022-05-08 RX ORDER — SIMETHICONE 80 MG
160 TABLET,CHEWABLE ORAL EVERY 4 HOURS PRN
Status: DISCONTINUED | OUTPATIENT
Start: 2022-05-08 | End: 2022-05-09 | Stop reason: HOSPADM

## 2022-05-08 RX ORDER — NIFEDIPINE 20 MG/1
20 CAPSULE ORAL EVERY 6 HOURS
Status: DISCONTINUED | OUTPATIENT
Start: 2022-05-08 | End: 2022-05-08

## 2022-05-08 RX ORDER — MAGNESIUM SULFATE HEPTAHYDRATE 40 MG/ML
4 INJECTION, SOLUTION INTRAVENOUS ONCE
Status: COMPLETED | OUTPATIENT
Start: 2022-05-08 | End: 2022-05-08

## 2022-05-08 RX ORDER — ACETAMINOPHEN 325 MG/1
650 TABLET ORAL EVERY 4 HOURS PRN
Status: DISCONTINUED | OUTPATIENT
Start: 2022-05-08 | End: 2022-05-09 | Stop reason: HOSPADM

## 2022-05-08 RX ADMIN — AMPICILLIN 2 G: 2 INJECTION, POWDER, FOR SOLUTION INTRAVENOUS at 19:58

## 2022-05-08 RX ADMIN — AZITHROMYCIN MONOHYDRATE 1000 MG: 250 TABLET ORAL at 14:31

## 2022-05-08 RX ADMIN — SODIUM CHLORIDE, POTASSIUM CHLORIDE, SODIUM LACTATE AND CALCIUM CHLORIDE: 600; 310; 30; 20 INJECTION, SOLUTION INTRAVENOUS at 19:57

## 2022-05-08 RX ADMIN — MAGNESIUM SULFATE HEPTAHYDRATE 2 G/HR: 40 INJECTION, SOLUTION INTRAVENOUS at 15:14

## 2022-05-08 RX ADMIN — MAGNESIUM SULFATE HEPTAHYDRATE 4 G: 40 INJECTION, SOLUTION INTRAVENOUS at 14:37

## 2022-05-08 RX ADMIN — AMPICILLIN 2 G: 2 INJECTION, POWDER, FOR SOLUTION INTRAVENOUS at 14:32

## 2022-05-08 RX ADMIN — SODIUM CHLORIDE, POTASSIUM CHLORIDE, SODIUM LACTATE AND CALCIUM CHLORIDE: 600; 310; 30; 20 INJECTION, SOLUTION INTRAVENOUS at 15:14

## 2022-05-08 RX ADMIN — BETAMETHASONE SODIUM PHOSPHATE AND BETAMETHASONE ACETATE 12 MG: 3; 3 INJECTION, SUSPENSION INTRA-ARTICULAR; INTRALESIONAL; INTRAMUSCULAR at 14:52

## 2022-05-08 RX ADMIN — MAGNESIUM SULFATE 2 G: 2 INJECTION INTRAVENOUS at 15:00

## 2022-05-08 RX ADMIN — SODIUM CHLORIDE, POTASSIUM CHLORIDE, SODIUM LACTATE AND CALCIUM CHLORIDE: 600; 310; 30; 20 INJECTION, SOLUTION INTRAVENOUS at 14:04

## 2022-05-08 NOTE — PLAN OF CARE
Goal Outcome Evaluation:  Data: Patient presented to [facility] at 1145. Reason for maternal/fetal assessment per patient is Contractions  Patient is a . Prenatal record reviewed.      OB History    Para Term  AB Living   1 0 0 0 0 0   SAB IAB Ectopic Multiple Live Births   0 0 0 0 0      # Outcome Date GA Lbr Arnaldo/2nd Weight Sex Delivery Anes PTL Lv   1 Current            . Medical history: No past medical history on file.. Gestational Age 27w2d. VSS. Fetal movement present X2. Patient denies backache, vaginal discharge, pelvic pressure, UTI symptoms, GI problems, bloody show, vaginal bleeding, edema, headache, visual disturbances, epigastric or URQ pain, rupture of membranes when pt in bathroom in triage at 1250 clear large amount Support persons  present.  Action: Verbal consent for EFM. Triage assessment completed. EFM applied for monitoring. Uterine assessment 4-8 minutes apart. Fetal assessment A and B: Presumed adequate fetal oxygenation documented (see flow record).   Response: Dr. Boyd and Dr Gallardo informed of pt arrival and status. Plan per provider is admission. Patient verbalized agreement with plan.

## 2022-05-08 NOTE — H&P
Maternal Fetal Medicine   History and Physical    No LMP recorded. Patient is pregnant.  Estimated Date of Delivery: Aug 5, 2022   Gestational age:  27w2d   Obstetric History:                Assessment and Plan   Jacinta Aguayo is a 37 year old  at 27w2d by IVF dating, who presents with contractions and during her triage evaluation was noted to be grossly ruptured and was thus recommended to be admitted until delivery.     #PPROM at 1250 on 22  - Recommended admission to antepartum until delivery.   - Started on Mag 6g (1437) > 2g/hr for at least the first 12h or until   - BMZ (). Recommended a rescue course of betamethasone for FLM as she is at high risk of delivering in the next week, she is amenable to this   -D# Latency abx   - Rom + positive, wet prep nl, WBC 14.3   - UA, Ucx, GC/CT pending  - Delivery indicated with signs of labor, infection, or worsening contractions/signs of labor. Currently no contraindication for expectant management  - Discussed dynamic situation and if she has any questions just let team know   -T&S q72h    Fetal well being  Felix twins  - Both FHT are reactive and appropriate for GA - continuous monitoring.  Arthur ():A 623g, variable. 87%. B 544g, 51%; breech  US (): cephalic, transverse  - Betamethasone series  and again   - Magnesium for fetal neuroprotection   - NICU consulted.  - Continue Qshift NSTs  - Q4 week growth US: ordered for tomorrow.    Short cervix  - PTA PV progesterone     Prenatal care  - Rh pos, Antibody negative.  - Rubella immune, Hep B SAg NR, RPR neg, HIV NR, GC/CT neg.  - GCT not yet collected.  - GBS positive    Method of delivery  # section:  -Admitted to L&D for scheduled repeat  section.  - No hx prior surgeries .   -Op note remarkable for:   -Consent obtained: The risks, benefits, and alternatives of  section were discussed, including the risks of bleeding, infection, injury to surrounding  organs, fetal injury, and remote risks of hysterectomy. We discussed at this GA it would be a classical hysterotomy and that would mean that all subsequent pregnancies we would recommend delivery between 36-37w.   She consented to a blood transfusion in the event of a life threatening amount of bleeding. She had time to ask questions and agreed to proceed. Surgical consent was signed. Blood transfusion consent signed.        Discussed the above careplan with Dr. Deb Gallardo MD   OBGYN PGY-3  22 11:34 PM          History of Present Illness      Jacinta Aguayo is a 37 year old  at 27w2d by IVF dating who presents today with contractions increasing in strength and frequency since last night around midnight. She notes they have not really dissipated and rate between a 4 to an 8. She sometimes has to stop talking 2/2 pain. .She otherwise feels well. Does not feel need for tylenol at the moment but will alert us if needed. She has been taking PV progesterone for short cervix    Denies fevers, chills, headaches, vision changes/spots, chest pain, shortness of breath, upper abdominal pain,  nausea, vomiting, diarrhea, constipation, vaginal bleeding, leaking fluid from vagina.  Active fetus.      Her pregnancy is complicated by:  - IVF pregnancy   - Di/di twin pregnancy   - Threatened  livr   - GBS positive  - Shortened cervix     OB history notable for:   OB History    Para Term  AB Living   1 0 0 0 0 0   SAB IAB Ectopic Multiple Live Births   0 0 0 0 0      # Outcome Date GA Lbr Arnaldo/2nd Weight Sex Delivery Anes PTL Lv   1 Current                Prenatal Labs  Recent Labs   Lab Test 22  1400   AS Negative    Rhogam not indicated     Lab Results   Component Value Date    AS Negative 2022    CHPCRT Negative 2022    GCPCRT Negative 2022    HGB 10.0 (L) 2022    HIV negative  G/C pendig   Rubella immune    GBS Status:   No results found for:  GBS    No results found for: PAP            Past Medical History   No past medical history on file.         Past Surgical History   No past surgical history on file.         Medications     Prior to Admission Medications   Prescriptions Last Dose Informant Patient Reported? Taking?   phentermine (ADIPEX-P) 37.5 mg tablet   No No   Sig: [PHENTERMINE (ADIPEX-P) 37.5 MG TABLET] Take 1/2 to 1 tablet in the morning.   progesterone (ENDOMETRIN) 100 MG vaginal insert 2022 at 2300  No Yes   Sig: Place 2 suppositories (200 mg) vaginally At Bedtime      Facility-Administered Medications: None       No Known Allergies         Social History     Social History     Tobacco Use     Smoking status: Former Smoker     Quit date: 2019     Years since quittin.9     Smokeless tobacco: Never Used   Substance Use Topics     Alcohol use: Not Currently     Drug use: Never    Denies drug use         Review of Systems   Pertinent positives and negatives as described above in HPI           Physical Exam     Vitals:    22 2000 22 2130 22 2245 22 2250   BP:   110/58    BP Location:       Patient Position:       Cuff Size:       Resp:     Temp: 98.3  F (36.8  C) 98.3  F (36.8  C) 98.2  F (36.8  C)    TempSrc: Oral Oral Oral    SpO2:   97% 98%     Gen: Pleasant, gravid woman.  Sitting up in bed, in NAD.  Heart:: normal and well perfused   Lungs: non labored breathing on room air   Abdomen: Non-tender, Gravid  Back: no lesions, no CVA tenderness  Extremities: moves all extremities equally     SSE: normal appearing external genitalia with no lesions, speculum placed without difficulty with  visualization of just anterior lip of cervix. Copious clear fluid coming from cervix and in the vagina. Vagina with normal appearing thin white discharge in vault.  No blood in vault or coming from cervix  Cervix: /-3 (1340, 5/8)           Membranes:  Grossly ruptured         Contraction frequency: 1-2 in 10  minutes    Fetus A  FHR:  Rate 150, mod variability, accelerations present, decelerations absent      Fetus B  FHR:  Rate 140, mod variability, accelerations present, decelerations absent           Category: overall appropriate for GA x2         Laboratory/Imaging   Pending labs: Hg, Type and Screen, RPR UA, UCX, wet prep, rom plus    Fetus 1: GENERAL EVALUATION  ---------------------------------------------------------------------------------------------------------  Cardiac activity present.  bpm.  Fetal movements visualized.  Presentation cephalic, maternal left.  Placenta Placental site: anterior, thick dividing membrane.  Umbilical cord previously studied.  Amniotic fluid Amount of AF: normal. MVP 5.5 cm.        Fetus 2: GENERAL EVALUATION  ---------------------------------------------------------------------------------------------------------  Cardiac activity present.  bpm.  Fetal movements visualized.  Presentation transverse with head to maternal right, superior.  Placenta Placental site: posterior, thick dividing membrane.  Umbilical cord previously studied.  Amniotic fluid Amount of AF: normal. MVP 6.3 cm.        Fetus 1: GENERAL EVALUATION  ---------------------------------------------------------------------------------------------------------  Cardiac activity present.  bpm.  Fetal movements present.  Presentation Variable, mat left, presenting.  Placenta Placental site: anterior. No Previa, > 2 cm from internal os, thick dividing membrane.  Umbilical cord 3 vessel cord.  Amniotic fluid Amount of AF: normal. MVP 4.9 cm.        Fetus 2: GENERAL EVALUATION  ---------------------------------------------------------------------------------------------------------  Cardiac activity present.  bpm.  Fetal movements present.  Presentation breech, maternal right, superior.  Placenta Placental site: posterior, thick dividing membrane.  Umbilical cord 3 vessel cord.  Amniotic fluid  Amount of AF: normal. MVP 5.3 cm.        Fetus 1: FETAL BIOMETRY  ---------------------------------------------------------------------------------------------------------  Main Fetal Biometry:  BPD                                        58.3                    mm                         23w 6d                Hadlock  OFD                                        76.5                    mm                         23w 3d                Nicolaides  HC                                          214.0                  mm                          23w 3d                Hadlock  Cerebellum tr                            25.0                   mm                          23w 0d                Nicolaides  AC                                          191.3                  mm                          23w 6d        77%        Hadlock  Femur                                      41.8                   mm                          23w 4d                Hadlock  Humerus                                  36.7                    mm                         22w 5d                Rishabh  Fetal Weight Calculation:  EFW                                       623                     g                                     87%        Hadlock  EFW (lb,oz)                             1 lb 6                  oz  EFW by                                   Hadlock (BPD-HC-AC-FL)  EFW discordance                     12.7                    %  Head / Face / Neck Biometry:                                             8.0                     mm  CM                                          4.6                     mm  Nasal bone                               7.6                     mm        Fetus 2: FETAL BIOMETRY  ---------------------------------------------------------------------------------------------------------  Main Fetal Biometry:  BPD                                        57.0                    mm                         23w 3d                 Hadlock  OFD                                        71.2                    mm                         22w 0d                Nicolaides  HC                                          205.5                  mm                          22w 5d                Hadlock  Cerebellum tr                            26.7                   mm                          24w 2d                Nicolaides  AC                                          185.5                  mm                          23w 2d        63%        Hadlock  Femur                                      38.4                   mm                          22w 2d                Hadlock  Humerus                                  35.2                    mm                         22w 1d                Rishabh  Fetal Weight Calculation:  EFW                                       544                     g                                     51%        Hadlock  EFW (lb,oz)                             1 lb 3                  oz  EFW by                                   Hadlock (BPD-HC-AC-FL)  EFW discordance                     12.7                    %  Head / Face / Neck Biometry:  CM                                          4.1                     mm  Nasal bone                               7.4                     mm        Fetus 1: FETAL ANATOMY  ---------------------------------------------------------------------------------------------------------  The following structures appear normal:  Head / Neck                         Cranium. Head size. Head shape. Lateral ventricles. Choroid plexus. Midline falx. Cavum septi pellucidi. Cerebellum. Cisterna magna.                                             Parenchyma. Thalami. Vermis.                                             Neck. Nuchal fold.  Face                                   Lips. Profile. Nose. Maxilla. Mandible. Orbits. Lens.  Heart / Thorax                      4-chamber view. RVOT view. LVOT view. Situs. Bicaval  view. Ductal arch view. Superior vena cava. Inferior vena cava. Cardiac position. Cardiac                                             size. Cardiac rhythm.                                             Right lung. Left lung. Diaphragm.  Abdomen                             Abdominal wall. Cord insertion. Stomach. Kidneys. Bladder. Liver. Bowel. Genitals.  Spine                                  Cervical spine. Thoracic spine. Lumbar spine. Sacral spine.  Extremities / Skeleton          Left foot.     The following structures could not be adequately visualized:  Heart / Thorax                      Aortic arch view. 3-vessel view. 3-vessel-trachea view.  Extremities / Skeleton          Right hand. Left hand. Right foot.     Gender: male.        Fetus 2: FETAL ANATOMY  ---------------------------------------------------------------------------------------------------------  The following structures appear normal:  Head / Neck                         Cranium. Head size. Head shape. Lateral ventricles. Choroid plexus. Midline falx. Cavum septi pellucidi. Cerebellum. Cisterna magna.                                             Parenchyma. Thalami. Vermis.                                             Neck. Nuchal fold.  Face                                   Lips. Profile. Nose. Maxilla. Mandible. Orbits. Lens.  Heart / Thorax                      4-chamber view. RVOT view. LVOT view. Situs. Aortic arch view. Bicaval view. Ductal arch view. Superior vena cava. Inferior vena cava. 3-vessel                                             view. 3-vessel-trachea view. Cardiac position. Cardiac size. Cardiac rhythm.                                             Right lung. Left lung. Diaphragm.  Abdomen                             Abdominal wall. Cord insertion. Stomach. Kidneys. Bladder. Liver. Bowel. Genitals.  Spine                                  Cervical spine. Thoracic spine.  Extremities / Skeleton          Right foot. Left  foot.     The following structures could not be adequately visualized:  Spine                                  Lumbar spine. Sacral spine.  Extremities / Skeleton          Right hand. Left hand.     Gender: female.        MATERNAL STRUCTURES  ---------------------------------------------------------------------------------------------------------  Cervix                                  Visualized                                             Appearance: Shortened but appears Closed                                             Approach - Transvaginal: Cervical length 13.1 mm  Right Ovary                          Visualized  Left Ovary                            Visualized      Physician Attestation   I, Yi Boyd MD, saw this patient with the resident and agree with the resident/fellow's findings and plan of care as documented in the note.      I personally reviewed vital signs, medications, labs and imaging.    Key findings: 36 yo G1 at 27w 3d by IVF dating admitted for evaluation for  contractions with subsequent PPROM. Pregnancy complicated by short cervix, on progesterone therapy. We discussed management of PPROM in detail, including expectant management inpatient until delivery. The risks associated with PPROM were discussed, including the risk for imminent delivery (50% within the next week), risk for maternal or fetal infection, and  labor abruption. Recommend latency antibiotics per protocol. Complete BMZ course. Magnesium sulfate for neuroprotection, with plan to repeat if delivery again appears imminent. Tocolysis not recommended given PPROM. Plan TID fetal surveillance starting now and twice weekly BPP beginning at 28 weeks gestation.    Yi Boyd MD  Date of Service (when I saw the patient): 22

## 2022-05-09 ENCOUNTER — APPOINTMENT (OUTPATIENT)
Dept: ULTRASOUND IMAGING | Facility: CLINIC | Age: 37
End: 2022-05-09
Payer: COMMERCIAL

## 2022-05-09 LAB
APTT PPP: 27 SECONDS (ref 22–38)
D DIMER PPP FEU-MCNC: 7.84 UG/ML FEU (ref 0–0.5)
ERYTHROCYTE [DISTWIDTH] IN BLOOD BY AUTOMATED COUNT: 15.3 % (ref 10–15)
FIBRINOGEN PPP-MCNC: 630 MG/DL (ref 170–490)
GP B STREP DNA SPEC QL NAA+PROBE: POSITIVE
HCT VFR BLD AUTO: 29.6 % (ref 35–47)
HGB BLD-MCNC: 9.2 G/DL (ref 11.7–15.7)
INR PPP: 1.11 (ref 0.86–1.14)
MCH RBC QN AUTO: 26.1 PG (ref 26.5–33)
MCHC RBC AUTO-ENTMCNC: 31.1 G/DL (ref 31.5–36.5)
MCV RBC AUTO: 84 FL (ref 78–100)
PLATELET # BLD AUTO: 156 10E3/UL (ref 150–450)
RBC # BLD AUTO: 3.53 10E6/UL (ref 3.8–5.2)
WBC # BLD AUTO: 8.7 10E3/UL (ref 4–11)

## 2022-05-09 PROCEDURE — 85730 THROMBOPLASTIN TIME PARTIAL: CPT | Performed by: STUDENT IN AN ORGANIZED HEALTH CARE EDUCATION/TRAINING PROGRAM

## 2022-05-09 PROCEDURE — 93971 EXTREMITY STUDY: CPT | Mod: LT

## 2022-05-09 PROCEDURE — 271N000001 HC OR GENERAL SUPPLY NON-STERILE: Performed by: OBSTETRICS & GYNECOLOGY

## 2022-05-09 PROCEDURE — 99222 1ST HOSP IP/OBS MODERATE 55: CPT | Performed by: NURSE PRACTITIONER

## 2022-05-09 PROCEDURE — 250N000013 HC RX MED GY IP 250 OP 250 PS 637: Performed by: STUDENT IN AN ORGANIZED HEALTH CARE EDUCATION/TRAINING PROGRAM

## 2022-05-09 PROCEDURE — 76816 OB US FOLLOW-UP PER FETUS: CPT

## 2022-05-09 PROCEDURE — 86780 TREPONEMA PALLIDUM: CPT | Performed by: STUDENT IN AN ORGANIZED HEALTH CARE EDUCATION/TRAINING PROGRAM

## 2022-05-09 PROCEDURE — 250N000011 HC RX IP 250 OP 636: Performed by: STUDENT IN AN ORGANIZED HEALTH CARE EDUCATION/TRAINING PROGRAM

## 2022-05-09 PROCEDURE — 85610 PROTHROMBIN TIME: CPT | Performed by: STUDENT IN AN ORGANIZED HEALTH CARE EDUCATION/TRAINING PROGRAM

## 2022-05-09 PROCEDURE — 272N000001 HC OR GENERAL SUPPLY STERILE: Performed by: OBSTETRICS & GYNECOLOGY

## 2022-05-09 PROCEDURE — 250N000011 HC RX IP 250 OP 636

## 2022-05-09 PROCEDURE — 710N000010 HC RECOVERY PHASE 1, LEVEL 2, PER MIN: Performed by: OBSTETRICS & GYNECOLOGY

## 2022-05-09 PROCEDURE — 120N000002 HC R&B MED SURG/OB UMMC

## 2022-05-09 PROCEDURE — 370N000017 HC ANESTHESIA TECHNICAL FEE, PER MIN: Performed by: OBSTETRICS & GYNECOLOGY

## 2022-05-09 PROCEDURE — 250N000013 HC RX MED GY IP 250 OP 250 PS 637

## 2022-05-09 PROCEDURE — 258N000003 HC RX IP 258 OP 636: Performed by: STUDENT IN AN ORGANIZED HEALTH CARE EDUCATION/TRAINING PROGRAM

## 2022-05-09 PROCEDURE — C9290 INJ, BUPIVACAINE LIPOSOME: HCPCS | Performed by: ANESTHESIOLOGY

## 2022-05-09 PROCEDURE — 250N000009 HC RX 250: Performed by: STUDENT IN AN ORGANIZED HEALTH CARE EDUCATION/TRAINING PROGRAM

## 2022-05-09 PROCEDURE — 36415 COLL VENOUS BLD VENIPUNCTURE: CPT | Performed by: STUDENT IN AN ORGANIZED HEALTH CARE EDUCATION/TRAINING PROGRAM

## 2022-05-09 PROCEDURE — 59514 CESAREAN DELIVERY ONLY: CPT | Mod: 22 | Performed by: OBSTETRICS & GYNECOLOGY

## 2022-05-09 PROCEDURE — 258N000003 HC RX IP 258 OP 636: Performed by: ANESTHESIOLOGY

## 2022-05-09 PROCEDURE — 93971 EXTREMITY STUDY: CPT | Mod: 26 | Performed by: RADIOLOGY

## 2022-05-09 PROCEDURE — 85379 FIBRIN DEGRADATION QUANT: CPT | Performed by: STUDENT IN AN ORGANIZED HEALTH CARE EDUCATION/TRAINING PROGRAM

## 2022-05-09 PROCEDURE — 999N000141 HC STATISTIC PRE-PROCEDURE NURSING ASSESSMENT: Performed by: OBSTETRICS & GYNECOLOGY

## 2022-05-09 PROCEDURE — 76816 OB US FOLLOW-UP PER FETUS: CPT | Mod: 26 | Performed by: OBSTETRICS & GYNECOLOGY

## 2022-05-09 PROCEDURE — 85027 COMPLETE CBC AUTOMATED: CPT | Performed by: STUDENT IN AN ORGANIZED HEALTH CARE EDUCATION/TRAINING PROGRAM

## 2022-05-09 PROCEDURE — 250N000011 HC RX IP 250 OP 636: Performed by: ANESTHESIOLOGY

## 2022-05-09 PROCEDURE — 360N000076 HC SURGERY LEVEL 3, PER MIN: Performed by: OBSTETRICS & GYNECOLOGY

## 2022-05-09 PROCEDURE — 88307 TISSUE EXAM BY PATHOLOGIST: CPT | Mod: TC | Performed by: STUDENT IN AN ORGANIZED HEALTH CARE EDUCATION/TRAINING PROGRAM

## 2022-05-09 PROCEDURE — 88307 TISSUE EXAM BY PATHOLOGIST: CPT | Mod: 26 | Performed by: PATHOLOGY

## 2022-05-09 PROCEDURE — 85384 FIBRINOGEN ACTIVITY: CPT | Performed by: STUDENT IN AN ORGANIZED HEALTH CARE EDUCATION/TRAINING PROGRAM

## 2022-05-09 RX ORDER — DIPHENHYDRAMINE HCL 25 MG
25 CAPSULE ORAL EVERY 6 HOURS PRN
Status: DISCONTINUED | OUTPATIENT
Start: 2022-05-09 | End: 2022-05-12 | Stop reason: HOSPADM

## 2022-05-09 RX ORDER — METHYLERGONOVINE MALEATE 0.2 MG/ML
200 INJECTION INTRAVENOUS
Status: DISCONTINUED | OUTPATIENT
Start: 2022-05-09 | End: 2022-05-12 | Stop reason: HOSPADM

## 2022-05-09 RX ORDER — DIPHENHYDRAMINE HYDROCHLORIDE 50 MG/ML
25 INJECTION INTRAMUSCULAR; INTRAVENOUS EVERY 6 HOURS PRN
Status: DISCONTINUED | OUTPATIENT
Start: 2022-05-09 | End: 2022-05-12 | Stop reason: HOSPADM

## 2022-05-09 RX ORDER — ONDANSETRON 4 MG/1
4 TABLET, ORALLY DISINTEGRATING ORAL EVERY 6 HOURS PRN
Status: DISCONTINUED | OUTPATIENT
Start: 2022-05-09 | End: 2022-05-12 | Stop reason: HOSPADM

## 2022-05-09 RX ORDER — ONDANSETRON 2 MG/ML
INJECTION INTRAMUSCULAR; INTRAVENOUS PRN
Status: DISCONTINUED | OUTPATIENT
Start: 2022-05-09 | End: 2022-05-09

## 2022-05-09 RX ORDER — TRANEXAMIC ACID 10 MG/ML
1 INJECTION, SOLUTION INTRAVENOUS EVERY 30 MIN PRN
Status: DISCONTINUED | OUTPATIENT
Start: 2022-05-09 | End: 2022-05-09 | Stop reason: HOSPADM

## 2022-05-09 RX ORDER — MISOPROSTOL 200 UG/1
400 TABLET ORAL
Status: DISCONTINUED | OUTPATIENT
Start: 2022-05-09 | End: 2022-05-09 | Stop reason: HOSPADM

## 2022-05-09 RX ORDER — FENTANYL CITRATE 50 UG/ML
INJECTION, SOLUTION INTRAMUSCULAR; INTRAVENOUS PRN
Status: DISCONTINUED | OUTPATIENT
Start: 2022-05-09 | End: 2022-05-09

## 2022-05-09 RX ORDER — OXYCODONE HYDROCHLORIDE 5 MG/1
5 TABLET ORAL EVERY 4 HOURS PRN
Status: DISCONTINUED | OUTPATIENT
Start: 2022-05-09 | End: 2022-05-12 | Stop reason: HOSPADM

## 2022-05-09 RX ORDER — BUPIVACAINE HYDROCHLORIDE 7.5 MG/ML
INJECTION, SOLUTION INTRASPINAL
Status: COMPLETED | OUTPATIENT
Start: 2022-05-09 | End: 2022-05-09

## 2022-05-09 RX ORDER — CITRIC ACID/SODIUM CITRATE 334-500MG
30 SOLUTION, ORAL ORAL
Status: COMPLETED | OUTPATIENT
Start: 2022-05-09 | End: 2022-05-09

## 2022-05-09 RX ORDER — LIDOCAINE 40 MG/G
CREAM TOPICAL
Status: DISCONTINUED | OUTPATIENT
Start: 2022-05-09 | End: 2022-05-12 | Stop reason: HOSPADM

## 2022-05-09 RX ORDER — CITRIC ACID/SODIUM CITRATE 334-500MG
SOLUTION, ORAL ORAL
Status: COMPLETED
Start: 2022-05-09 | End: 2022-05-09

## 2022-05-09 RX ORDER — OXYTOCIN 10 [USP'U]/ML
10 INJECTION, SOLUTION INTRAMUSCULAR; INTRAVENOUS
Status: DISCONTINUED | OUTPATIENT
Start: 2022-05-09 | End: 2022-05-12 | Stop reason: HOSPADM

## 2022-05-09 RX ORDER — CEFAZOLIN SODIUM 1 G/3ML
INJECTION, POWDER, FOR SOLUTION INTRAMUSCULAR; INTRAVENOUS PRN
Status: DISCONTINUED | OUTPATIENT
Start: 2022-05-09 | End: 2022-05-09

## 2022-05-09 RX ORDER — NALOXONE HYDROCHLORIDE 0.4 MG/ML
0.2 INJECTION, SOLUTION INTRAMUSCULAR; INTRAVENOUS; SUBCUTANEOUS
Status: DISCONTINUED | OUTPATIENT
Start: 2022-05-09 | End: 2022-05-09

## 2022-05-09 RX ORDER — MISOPROSTOL 200 UG/1
800 TABLET ORAL
Status: DISCONTINUED | OUTPATIENT
Start: 2022-05-09 | End: 2022-05-12 | Stop reason: HOSPADM

## 2022-05-09 RX ORDER — PROCHLORPERAZINE MALEATE 10 MG
10 TABLET ORAL EVERY 6 HOURS PRN
Status: DISCONTINUED | OUTPATIENT
Start: 2022-05-09 | End: 2022-05-12 | Stop reason: HOSPADM

## 2022-05-09 RX ORDER — AZITHROMYCIN 500 MG/5ML
500 INJECTION, POWDER, LYOPHILIZED, FOR SOLUTION INTRAVENOUS
Status: DISCONTINUED | OUTPATIENT
Start: 2022-05-09 | End: 2022-05-09 | Stop reason: HOSPADM

## 2022-05-09 RX ORDER — MISOPROSTOL 200 UG/1
TABLET ORAL
Status: DISCONTINUED
Start: 2022-05-09 | End: 2022-05-09 | Stop reason: HOSPADM

## 2022-05-09 RX ORDER — MAGNESIUM SULFATE HEPTAHYDRATE 40 MG/ML
4 INJECTION, SOLUTION INTRAVENOUS ONCE
Status: COMPLETED | OUTPATIENT
Start: 2022-05-09 | End: 2022-05-09

## 2022-05-09 RX ORDER — IBUPROFEN 800 MG/1
800 TABLET, FILM COATED ORAL EVERY 6 HOURS
Status: DISCONTINUED | OUTPATIENT
Start: 2022-05-10 | End: 2022-05-12 | Stop reason: HOSPADM

## 2022-05-09 RX ORDER — NALOXONE HYDROCHLORIDE 0.4 MG/ML
0.2 INJECTION, SOLUTION INTRAMUSCULAR; INTRAVENOUS; SUBCUTANEOUS
Status: DISCONTINUED | OUTPATIENT
Start: 2022-05-09 | End: 2022-05-12 | Stop reason: HOSPADM

## 2022-05-09 RX ORDER — LIDOCAINE 40 MG/G
CREAM TOPICAL
Status: DISCONTINUED | OUTPATIENT
Start: 2022-05-09 | End: 2022-05-09 | Stop reason: HOSPADM

## 2022-05-09 RX ORDER — DEXTROSE, SODIUM CHLORIDE, SODIUM LACTATE, POTASSIUM CHLORIDE, AND CALCIUM CHLORIDE 5; .6; .31; .03; .02 G/100ML; G/100ML; G/100ML; G/100ML; G/100ML
INJECTION, SOLUTION INTRAVENOUS CONTINUOUS
Status: DISCONTINUED | OUTPATIENT
Start: 2022-05-09 | End: 2022-05-12 | Stop reason: HOSPADM

## 2022-05-09 RX ORDER — MODIFIED LANOLIN
OINTMENT (GRAM) TOPICAL
Status: DISCONTINUED | OUTPATIENT
Start: 2022-05-09 | End: 2022-05-12 | Stop reason: HOSPADM

## 2022-05-09 RX ORDER — OXYTOCIN 10 [USP'U]/ML
10 INJECTION, SOLUTION INTRAMUSCULAR; INTRAVENOUS
Status: DISCONTINUED | OUTPATIENT
Start: 2022-05-09 | End: 2022-05-09 | Stop reason: HOSPADM

## 2022-05-09 RX ORDER — BISACODYL 10 MG
10 SUPPOSITORY, RECTAL RECTAL DAILY PRN
Status: DISCONTINUED | OUTPATIENT
Start: 2022-05-11 | End: 2022-05-12 | Stop reason: HOSPADM

## 2022-05-09 RX ORDER — MAGNESIUM SULFATE HEPTAHYDRATE 40 MG/ML
INJECTION, SOLUTION INTRAVENOUS
Status: COMPLETED
Start: 2022-05-09 | End: 2022-05-09

## 2022-05-09 RX ORDER — MISOPROSTOL 200 UG/1
400 TABLET ORAL
Status: DISCONTINUED | OUTPATIENT
Start: 2022-05-09 | End: 2022-05-12 | Stop reason: HOSPADM

## 2022-05-09 RX ORDER — CEFAZOLIN SODIUM 2 G/100ML
INJECTION, SOLUTION INTRAVENOUS PRN
Status: DISCONTINUED | OUTPATIENT
Start: 2022-05-09 | End: 2022-05-09

## 2022-05-09 RX ORDER — METHYLERGONOVINE MALEATE 0.2 MG/ML
INJECTION INTRAVENOUS PRN
Status: DISCONTINUED | OUTPATIENT
Start: 2022-05-09 | End: 2022-05-09

## 2022-05-09 RX ORDER — FENTANYL CITRATE-0.9 % NACL/PF 10 MCG/ML
PLASTIC BAG, INJECTION (ML) INTRAVENOUS CONTINUOUS PRN
Status: DISCONTINUED | OUTPATIENT
Start: 2022-05-09 | End: 2022-05-09

## 2022-05-09 RX ORDER — CITRIC ACID/SODIUM CITRATE 334-500MG
SOLUTION, ORAL ORAL
Status: DISCONTINUED
Start: 2022-05-09 | End: 2022-05-09 | Stop reason: HOSPADM

## 2022-05-09 RX ORDER — KETOROLAC TROMETHAMINE 30 MG/ML
30 INJECTION, SOLUTION INTRAMUSCULAR; INTRAVENOUS EVERY 6 HOURS
Status: COMPLETED | OUTPATIENT
Start: 2022-05-10 | End: 2022-05-10

## 2022-05-09 RX ORDER — FENTANYL CITRATE 50 UG/ML
25 INJECTION, SOLUTION INTRAMUSCULAR; INTRAVENOUS EVERY 5 MIN PRN
Status: DISCONTINUED | OUTPATIENT
Start: 2022-05-09 | End: 2022-05-09 | Stop reason: HOSPADM

## 2022-05-09 RX ORDER — OXYTOCIN/0.9 % SODIUM CHLORIDE 30/500 ML
340 PLASTIC BAG, INJECTION (ML) INTRAVENOUS CONTINUOUS PRN
Status: DISCONTINUED | OUTPATIENT
Start: 2022-05-09 | End: 2022-05-12 | Stop reason: HOSPADM

## 2022-05-09 RX ORDER — HYDROMORPHONE HCL IN WATER/PF 6 MG/30 ML
0.2 PATIENT CONTROLLED ANALGESIA SYRINGE INTRAVENOUS EVERY 5 MIN PRN
Status: DISCONTINUED | OUTPATIENT
Start: 2022-05-09 | End: 2022-05-09 | Stop reason: HOSPADM

## 2022-05-09 RX ORDER — ACETAMINOPHEN 325 MG/1
975 TABLET ORAL EVERY 6 HOURS
Status: DISCONTINUED | OUTPATIENT
Start: 2022-05-09 | End: 2022-05-12 | Stop reason: HOSPADM

## 2022-05-09 RX ORDER — NALOXONE HYDROCHLORIDE 0.4 MG/ML
0.4 INJECTION, SOLUTION INTRAMUSCULAR; INTRAVENOUS; SUBCUTANEOUS
Status: DISCONTINUED | OUTPATIENT
Start: 2022-05-09 | End: 2022-05-09

## 2022-05-09 RX ORDER — KETOROLAC TROMETHAMINE 30 MG/ML
INJECTION, SOLUTION INTRAMUSCULAR; INTRAVENOUS PRN
Status: DISCONTINUED | OUTPATIENT
Start: 2022-05-09 | End: 2022-05-09

## 2022-05-09 RX ORDER — FENTANYL CITRATE-0.9 % NACL/PF 10 MCG/ML
100 PLASTIC BAG, INJECTION (ML) INTRAVENOUS EVERY 5 MIN PRN
Status: DISCONTINUED | OUTPATIENT
Start: 2022-05-09 | End: 2022-05-09 | Stop reason: HOSPADM

## 2022-05-09 RX ORDER — HYDROCORTISONE 2.5 %
CREAM (GRAM) TOPICAL 3 TIMES DAILY PRN
Status: DISCONTINUED | OUTPATIENT
Start: 2022-05-09 | End: 2022-05-12 | Stop reason: HOSPADM

## 2022-05-09 RX ORDER — ONDANSETRON 2 MG/ML
4 INJECTION INTRAMUSCULAR; INTRAVENOUS EVERY 6 HOURS PRN
Status: DISCONTINUED | OUTPATIENT
Start: 2022-05-09 | End: 2022-05-12 | Stop reason: HOSPADM

## 2022-05-09 RX ORDER — AMOXICILLIN 250 MG
1 CAPSULE ORAL 2 TIMES DAILY
Status: DISCONTINUED | OUTPATIENT
Start: 2022-05-09 | End: 2022-05-12 | Stop reason: HOSPADM

## 2022-05-09 RX ORDER — FENTANYL CITRATE 50 UG/ML
INJECTION, SOLUTION INTRAMUSCULAR; INTRAVENOUS
Status: COMPLETED | OUTPATIENT
Start: 2022-05-09 | End: 2022-05-09

## 2022-05-09 RX ORDER — NALBUPHINE HYDROCHLORIDE 20 MG/ML
2.5-5 INJECTION, SOLUTION INTRAMUSCULAR; INTRAVENOUS; SUBCUTANEOUS EVERY 6 HOURS PRN
Status: DISCONTINUED | OUTPATIENT
Start: 2022-05-09 | End: 2022-05-09

## 2022-05-09 RX ORDER — METHYLERGONOVINE MALEATE 0.2 MG/ML
200 INJECTION INTRAVENOUS
Status: DISCONTINUED | OUTPATIENT
Start: 2022-05-09 | End: 2022-05-09 | Stop reason: HOSPADM

## 2022-05-09 RX ORDER — BUPIVACAINE HYDROCHLORIDE 2.5 MG/ML
INJECTION, SOLUTION EPIDURAL; INFILTRATION; INTRACAUDAL
Status: COMPLETED | OUTPATIENT
Start: 2022-05-09 | End: 2022-05-09

## 2022-05-09 RX ORDER — SODIUM CHLORIDE, SODIUM LACTATE, POTASSIUM CHLORIDE, CALCIUM CHLORIDE 600; 310; 30; 20 MG/100ML; MG/100ML; MG/100ML; MG/100ML
INJECTION, SOLUTION INTRAVENOUS CONTINUOUS
Status: DISCONTINUED | OUTPATIENT
Start: 2022-05-09 | End: 2022-05-09 | Stop reason: HOSPADM

## 2022-05-09 RX ORDER — NALOXONE HYDROCHLORIDE 0.4 MG/ML
0.4 INJECTION, SOLUTION INTRAMUSCULAR; INTRAVENOUS; SUBCUTANEOUS
Status: DISCONTINUED | OUTPATIENT
Start: 2022-05-09 | End: 2022-05-12 | Stop reason: HOSPADM

## 2022-05-09 RX ORDER — CARBOPROST TROMETHAMINE 250 UG/ML
INJECTION, SOLUTION INTRAMUSCULAR PRN
Status: DISCONTINUED | OUTPATIENT
Start: 2022-05-09 | End: 2022-05-09

## 2022-05-09 RX ORDER — OXYTOCIN/0.9 % SODIUM CHLORIDE 30/500 ML
100-340 PLASTIC BAG, INJECTION (ML) INTRAVENOUS CONTINUOUS PRN
Status: DISCONTINUED | OUTPATIENT
Start: 2022-05-09 | End: 2022-05-12 | Stop reason: HOSPADM

## 2022-05-09 RX ORDER — FENTANYL CITRATE 50 UG/ML
20 INJECTION, SOLUTION INTRAMUSCULAR; INTRAVENOUS ONCE
Status: DISCONTINUED | OUTPATIENT
Start: 2022-05-09 | End: 2022-05-09 | Stop reason: HOSPADM

## 2022-05-09 RX ORDER — TRANEXAMIC ACID 10 MG/ML
1 INJECTION, SOLUTION INTRAVENOUS EVERY 30 MIN PRN
Status: DISCONTINUED | OUTPATIENT
Start: 2022-05-09 | End: 2022-05-12 | Stop reason: HOSPADM

## 2022-05-09 RX ORDER — ONDANSETRON 4 MG/1
4 TABLET, ORALLY DISINTEGRATING ORAL EVERY 30 MIN PRN
Status: DISCONTINUED | OUTPATIENT
Start: 2022-05-09 | End: 2022-05-09 | Stop reason: HOSPADM

## 2022-05-09 RX ORDER — PROCHLORPERAZINE 25 MG
25 SUPPOSITORY, RECTAL RECTAL EVERY 12 HOURS PRN
Status: DISCONTINUED | OUTPATIENT
Start: 2022-05-09 | End: 2022-05-12 | Stop reason: HOSPADM

## 2022-05-09 RX ORDER — SIMETHICONE 80 MG
80 TABLET,CHEWABLE ORAL 4 TIMES DAILY PRN
Status: DISCONTINUED | OUTPATIENT
Start: 2022-05-09 | End: 2022-05-12 | Stop reason: HOSPADM

## 2022-05-09 RX ORDER — ONDANSETRON 2 MG/ML
4 INJECTION INTRAMUSCULAR; INTRAVENOUS EVERY 30 MIN PRN
Status: DISCONTINUED | OUTPATIENT
Start: 2022-05-09 | End: 2022-05-09 | Stop reason: HOSPADM

## 2022-05-09 RX ORDER — AMOXICILLIN 250 MG
2 CAPSULE ORAL 2 TIMES DAILY
Status: DISCONTINUED | OUTPATIENT
Start: 2022-05-09 | End: 2022-05-12 | Stop reason: HOSPADM

## 2022-05-09 RX ORDER — SODIUM CHLORIDE, SODIUM LACTATE, POTASSIUM CHLORIDE, CALCIUM CHLORIDE 600; 310; 30; 20 MG/100ML; MG/100ML; MG/100ML; MG/100ML
INJECTION, SOLUTION INTRAVENOUS CONTINUOUS PRN
Status: DISCONTINUED | OUTPATIENT
Start: 2022-05-09 | End: 2022-05-09

## 2022-05-09 RX ORDER — MAGNESIUM SULFATE HEPTAHYDRATE 40 MG/ML
2 INJECTION, SOLUTION INTRAVENOUS ONCE
Status: COMPLETED | OUTPATIENT
Start: 2022-05-09 | End: 2022-05-09

## 2022-05-09 RX ORDER — ACETAMINOPHEN 325 MG/1
975 TABLET ORAL ONCE
Status: COMPLETED | OUTPATIENT
Start: 2022-05-09 | End: 2022-05-09

## 2022-05-09 RX ORDER — OXYTOCIN/0.9 % SODIUM CHLORIDE 30/500 ML
340 PLASTIC BAG, INJECTION (ML) INTRAVENOUS CONTINUOUS PRN
Status: DISCONTINUED | OUTPATIENT
Start: 2022-05-09 | End: 2022-05-09 | Stop reason: HOSPADM

## 2022-05-09 RX ORDER — MORPHINE SULFATE 0.5 MG/ML
150 INJECTION, SOLUTION EPIDURAL; INTRATHECAL; INTRAVENOUS ONCE
Status: COMPLETED | OUTPATIENT
Start: 2022-05-09 | End: 2022-05-09

## 2022-05-09 RX ORDER — OXYTOCIN/0.9 % SODIUM CHLORIDE 30/500 ML
PLASTIC BAG, INJECTION (ML) INTRAVENOUS CONTINUOUS PRN
Status: DISCONTINUED | OUTPATIENT
Start: 2022-05-09 | End: 2022-05-09

## 2022-05-09 RX ORDER — CARBOPROST TROMETHAMINE 250 UG/ML
250 INJECTION, SOLUTION INTRAMUSCULAR
Status: DISCONTINUED | OUTPATIENT
Start: 2022-05-09 | End: 2022-05-12 | Stop reason: HOSPADM

## 2022-05-09 RX ORDER — METOCLOPRAMIDE HYDROCHLORIDE 5 MG/ML
10 INJECTION INTRAMUSCULAR; INTRAVENOUS EVERY 6 HOURS PRN
Status: DISCONTINUED | OUTPATIENT
Start: 2022-05-09 | End: 2022-05-12 | Stop reason: HOSPADM

## 2022-05-09 RX ORDER — CEFAZOLIN SODIUM 2 G/100ML
2 INJECTION, SOLUTION INTRAVENOUS
Status: DISCONTINUED | OUTPATIENT
Start: 2022-05-09 | End: 2022-05-09 | Stop reason: HOSPADM

## 2022-05-09 RX ORDER — CARBOPROST TROMETHAMINE 250 UG/ML
250 INJECTION, SOLUTION INTRAMUSCULAR
Status: DISCONTINUED | OUTPATIENT
Start: 2022-05-09 | End: 2022-05-09 | Stop reason: HOSPADM

## 2022-05-09 RX ORDER — METOCLOPRAMIDE 10 MG/1
10 TABLET ORAL EVERY 6 HOURS PRN
Status: DISCONTINUED | OUTPATIENT
Start: 2022-05-09 | End: 2022-05-12 | Stop reason: HOSPADM

## 2022-05-09 RX ORDER — MISOPROSTOL 200 UG/1
800 TABLET ORAL
Status: DISCONTINUED | OUTPATIENT
Start: 2022-05-09 | End: 2022-05-09 | Stop reason: HOSPADM

## 2022-05-09 RX ORDER — MORPHINE SULFATE 1 MG/ML
INJECTION, SOLUTION EPIDURAL; INTRATHECAL; INTRAVENOUS
Status: COMPLETED | OUTPATIENT
Start: 2022-05-09 | End: 2022-05-09

## 2022-05-09 RX ORDER — CEFAZOLIN SODIUM 2 G/100ML
2 INJECTION, SOLUTION INTRAVENOUS SEE ADMIN INSTRUCTIONS
Status: DISCONTINUED | OUTPATIENT
Start: 2022-05-09 | End: 2022-05-09 | Stop reason: HOSPADM

## 2022-05-09 RX ADMIN — SODIUM CHLORIDE, POTASSIUM CHLORIDE, SODIUM LACTATE AND CALCIUM CHLORIDE: 600; 310; 30; 20 INJECTION, SOLUTION INTRAVENOUS at 21:44

## 2022-05-09 RX ADMIN — SODIUM CHLORIDE, POTASSIUM CHLORIDE, SODIUM LACTATE AND CALCIUM CHLORIDE: 600; 310; 30; 20 INJECTION, SOLUTION INTRAVENOUS at 19:17

## 2022-05-09 RX ADMIN — MORPHINE SULFATE 0.15 MG: 1 INJECTION EPIDURAL; INTRATHECAL; INTRAVENOUS at 19:22

## 2022-05-09 RX ADMIN — Medication 30 ML: at 18:53

## 2022-05-09 RX ADMIN — AMPICILLIN 2 G: 2 INJECTION, POWDER, FOR SOLUTION INTRAVENOUS at 08:45

## 2022-05-09 RX ADMIN — BUPIVACAINE HYDROCHLORIDE IN DEXTROSE 1.6 ML: 7.5 INJECTION, SOLUTION SUBARACHNOID at 19:22

## 2022-05-09 RX ADMIN — Medication 2 G: at 19:14

## 2022-05-09 RX ADMIN — BUPIVACAINE 20 ML: 13.3 INJECTION, SUSPENSION, LIPOSOMAL INFILTRATION at 20:35

## 2022-05-09 RX ADMIN — METHYLERGONOVINE MALEATE 200 MCG: 0.2 INJECTION INTRAVENOUS at 19:50

## 2022-05-09 RX ADMIN — AMPICILLIN 2 G: 2 INJECTION, POWDER, FOR SOLUTION INTRAVENOUS at 02:26

## 2022-05-09 RX ADMIN — AZITHROMYCIN MONOHYDRATE 500 MG: 500 INJECTION, POWDER, LYOPHILIZED, FOR SOLUTION INTRAVENOUS at 19:28

## 2022-05-09 RX ADMIN — CARBOPROST TROMETHAMINE 250 MCG: 250 INJECTION, SOLUTION INTRAMUSCULAR at 20:03

## 2022-05-09 RX ADMIN — POLYETHYLENE GLYCOL 3350 17 G: 17 POWDER, FOR SOLUTION ORAL at 08:49

## 2022-05-09 RX ADMIN — AMPICILLIN 2 G: 2 INJECTION, POWDER, FOR SOLUTION INTRAVENOUS at 14:50

## 2022-05-09 RX ADMIN — FENTANYL CITRATE 20 MCG: 50 INJECTION, SOLUTION INTRAMUSCULAR; INTRAVENOUS at 19:22

## 2022-05-09 RX ADMIN — MAGNESIUM SULFATE HEPTAHYDRATE 4 G: 40 INJECTION, SOLUTION INTRAVENOUS at 18:21

## 2022-05-09 RX ADMIN — MAGNESIUM SULFATE 2 G: 2 INJECTION INTRAVENOUS at 18:43

## 2022-05-09 RX ADMIN — SENNOSIDES AND DOCUSATE SODIUM 1 TABLET: 50; 8.6 TABLET ORAL at 08:49

## 2022-05-09 RX ADMIN — BUPIVACAINE HYDROCHLORIDE 20 ML: 2.5 INJECTION, SOLUTION EPIDURAL; INFILTRATION; INTRACAUDAL at 20:35

## 2022-05-09 RX ADMIN — ONDANSETRON 4 MG: 2 INJECTION INTRAMUSCULAR; INTRAVENOUS at 19:32

## 2022-05-09 RX ADMIN — CEFAZOLIN 1 G: 1 INJECTION, POWDER, FOR SOLUTION INTRAMUSCULAR; INTRAVENOUS at 19:59

## 2022-05-09 RX ADMIN — MAGNESIUM SULFATE HEPTAHYDRATE 2 G: 40 INJECTION, SOLUTION INTRAVENOUS at 18:43

## 2022-05-09 RX ADMIN — CEFAZOLIN 1 G: 1 INJECTION, POWDER, FOR SOLUTION INTRAMUSCULAR; INTRAVENOUS at 19:58

## 2022-05-09 RX ADMIN — TRANEXAMIC ACID 1 G: 1 INJECTION, SOLUTION INTRAVENOUS at 19:44

## 2022-05-09 RX ADMIN — FENTANYL CITRATE 25 MCG: 50 INJECTION, SOLUTION INTRAMUSCULAR; INTRAVENOUS at 20:07

## 2022-05-09 RX ADMIN — MORPHINE SULFATE 150 MCG: 0.5 INJECTION, SOLUTION EPIDURAL; INTRATHECAL; INTRAVENOUS at 20:58

## 2022-05-09 RX ADMIN — Medication 50 MCG/MIN: at 19:14

## 2022-05-09 RX ADMIN — KETOROLAC TROMETHAMINE 30 MG: 30 INJECTION, SOLUTION INTRAMUSCULAR at 20:26

## 2022-05-09 RX ADMIN — SODIUM CITRATE AND CITRIC ACID MONOHYDRATE 30 ML: 500; 334 SOLUTION ORAL at 18:53

## 2022-05-09 RX ADMIN — OXYTOCIN-SODIUM CHLORIDE 0.9% IV SOLN 30 UNIT/500ML 600 ML/HR: 30-0.9/5 SOLUTION at 19:40

## 2022-05-09 RX ADMIN — BETAMETHASONE SODIUM PHOSPHATE AND BETAMETHASONE ACETATE 12 MG: 3; 3 INJECTION, SUSPENSION INTRA-ARTICULAR; INTRALESIONAL; INTRAMUSCULAR at 14:52

## 2022-05-09 RX ADMIN — ACETAMINOPHEN 975 MG: 325 TABLET ORAL at 18:58

## 2022-05-09 NOTE — ANESTHESIA PREPROCEDURE EVALUATION
Anesthesia Pre-Procedure Evaluation    Patient: Jacinta Aguayo   MRN: [id number] : 1985        Procedure : Procedure(s):   SECTION          No past medical history on file.   No past surgical history on file.   No Known Allergies   Social History     Tobacco Use     Smoking status: Former Smoker     Quit date: 2019     Years since quittin.9     Smokeless tobacco: Never Used   Substance Use Topics     Alcohol use: Not Currently      Wt Readings from Last 1 Encounters:   19 82.6 kg (182 lb)        Anesthesia Evaluation            ROS/MED HX  ENT/Pulmonary:  - neg pulmonary ROS     Neurologic:       Cardiovascular: Comment: HLD      METS/Exercise Tolerance:     Hematologic:       Musculoskeletal:       GI/Hepatic:  - neg GI/hepatic ROS     Renal/Genitourinary:       Endo:     (+) Obesity,     Psychiatric/Substance Use:  - neg psychiatric ROS     Infectious Disease:       Malignancy:       Other:   27w3d   PPROM   (+) , twin IUP,  (-) previous  and TOLAC candidate       Physical Exam    Airway        Mallampati: II   TM distance: > 3 FB   Neck ROM: full   Mouth opening: > 3 cm    Respiratory Devices and Support         Dental  no notable dental history         Cardiovascular   cardiovascular exam normal          Pulmonary   pulmonary exam normal                OUTSIDE LABS:  CBC:   Lab Results   Component Value Date    WBC 14.3 (H) 2022    WBC 13.2 (H) 2022    HGB 10.0 (L) 2022    HGB 10.2 (L) 2022    HCT 31.0 (L) 2022    HCT 31.7 (L) 2022     2022     2022     BMP: No results found for: NA, POTASSIUM, CHLORIDE, CO2, BUN, CR, GLC  COAGS: No results found for: PTT, INR, FIBR  POC: No results found for: BGM, HCG, HCGS  HEPATIC: No results found for: ALBUMIN, PROTTOTAL, ALT, AST, GGT, ALKPHOS, BILITOTAL, BILIDIRECT, LIDA  OTHER:   Lab Results   Component Value Date    A1C 6.0 2019    TSH 2.06 2019        Anesthesia Plan    ASA Status:  2   NPO Status:  ELEVATED Aspiration Risk/Unknown    Anesthesia Type: Spinal.              Consents         - Extended Intubation/Ventilatory Support Discussed: No.      - Patient is DNR/DNI Status: No    Use of blood products discussed: No .     Postoperative Care    Pain management: Multi-modal analgesia.   PONV prophylaxis: Ondansetron (or other 5HT-3)     Comments:                Trino López

## 2022-05-09 NOTE — PLAN OF CARE
Goal Outcome Evaluation:      Continues to lose a small amount of clear amniotic fluid. Uterus non tender. VSS and afebrile. Having occasional contractions, 3 in 1 hour during EFM session. Twin A FHT's 145 with moderate variability and accelerations. No decelerations note. Twin B FHT's 135 with moderate variability and accelerations and several variable decelerations. This morning complained of cramping in her left calf and stated that she has been walking with a limp. Negative Homans sign. To US to rule out DVT. SW here for consult and NNP plans to come this afternoon. Stable.

## 2022-05-09 NOTE — CONSULTS
"[facility]  MATERNAL CHILD HEALTH   INITIAL NICU PSYCHOSOCIAL ASSESSMENT     DATA:     Presenting Information: Pt, Jacinta, is a 37 year old  at 27w3d by IVF dating admitted for PPROM. She delivered di/di twins, male and female, on . SW was consulted for antepartum assessment on  prior to Jacinta's delivery.     Living Situation: Parents, Jacinta and Ricardo, are  and live together in [place], [place]. Jacinta reports they recently bought a house here.    Social Support: Jacinta shares they have much family support at this time. She reports having a large family, most of whom live in the area. She reports Alex's family lives in [place] but are also supportive.     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 Due to the nature of her job, she is unable to work remotely and anticipates she will have to quit. Jacinta reports she will not be returning to work following the birth of her babies and plans to stay at home full time to care for them. 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 She shares this is a relatively new business. Ricardo is a full time  working 12 hour shifts from 3pm-3am. Jacinta reports he is off Friday- but has recently been working overtime for extra money.     Insurance: [employer], through Ricardo's work.     Transportation: Stable.     Source of Financial Support: parental employment. Jacinta reports she was unable to qualify for WIC as they were just over the financial cut off. She endorses finances will likely be a stressor due to her inability to work.     Mental Health History: Jacinta denies any significant concerns with her mental health, mood, or coping.     Diagnoses: None reported.    Medications: No medications at this time.    Therapy: Not currently in therapy. "     History of Postpartum Mood Disorders: This is Jacinta's first pregnancy.     Chemical Health History: Jacinta denies a history of chemical health concerns. None noted in the chart.     Legal/Child Protection Involvement: None reported.     INTERVENTION:       Chart review    Collaboration with team: Spoke with MAUREEN Marquez.     Conducted Psychosocial Assessment    Introduction to Maternal Child Health SW role and scope of practice    Orientation to the NICU (parking, lodging, meals, visitation)    Validated emotions and provided supportive listening    Provided psychoeducation on  mood disorders and indicated that SW would continue to monitor mood and support bridging to mental health resources as needed.    Provided resources and referrals    parking pass- one month.     Provided SW contact info    ASSESSMENT:     Coping: Sw met with Jacinta on the antepartum unit on  and followed up with both parents on the  family care center on 5/10. Parents appeared to be coping well at this time and shared they were able to visit their babies in the NICU. Jacnita reports it was unexpected that she went into labor so soon as she was anticipating a longer stay on the antepartum unit. Family did not endorse initial questions or concerns about the NICU. Jacinta shares she was able to ask questions the day prior during her NICU consult.     Assessment of parental risk for PMAD: Higher than average risk, considering medically complexity, anticipated length of NICU stay, and birth of multiples for first time parents.     Risk Factors: first time parents, limited financial resources, birth of multiples, hospitalization during a global pandemic and unexpected hospitalization     Resiliency Factors & Strengths: local family, strong social support, parental employment, stable housing, reliable transportation, able and willing to ask for help/accept help, able and willing to ask advocate for self/baby, demonstrated ability to  integrate new information  and actively seeking resources     PLAN:     SW will continue to follow for supportive intervention and assess ongoing needs related to families financial stressors.     MACY Gleason  Maternal Child Health   Phone: [phone]  Pager: [phone]  After hours pager: [phone]

## 2022-05-09 NOTE — PROGRESS NOTES
Antepartum progress note    S: Patient reporting increasingly frequent and uncomfortable contractions for past 2-3 hours. Feels very different than when she presented.    O: /58 (BP Location: Left arm, Patient Position: Semi-Randhawa's, Cuff Size: Adult Regular)   Temp 98.5  F (36.9  C) (Oral)   Resp 16   SpO2 96%   Gen: sitting in bed, appears intermittently uncomfortable  SSE: Profuse watery white discharge, appears 2-3 cm dilated  SVE: 3/-2    A/P: Jacinta is a 37 year old admitted with PPROM. Now in  labor. Reviewed recommendation for magnesium load and delivery via  section given fetal size and presentation. Patient previously consented for possible classical  section. She has no questions or concerns. Labs are up to date.   NICU, charge RN and anesthesia updated. Will plan to move to OR for primary  section within next hour.     Patient discussed with Dr. Boyd.    Jailene Barksdale MD  Obstetrics and Gynecology, PGY-3  22 6:22 PM

## 2022-05-09 NOTE — PROVIDER NOTIFICATION
"   05/09/22 0219   Provider Notification   Provider Name/Title THOM Barksdale G3   Method of Notification Electronic Page   Request Evaluate - Remote   Notification Reason Status Update   Paged MD, \"pt at 12 hours of mag & continuous EFM monitoring. Should we discontinue?\"  "

## 2022-05-09 NOTE — PLAN OF CARE
Premature Rupture of Membranes  Data: Afebrile. Leaking small amounts of clear fluid. Contraction pattern stable and within parameters. Denies feeling contractions. Fetal assessment Appropriate for Gestational Age x2. Signs and symptoms of infection absent. TVUS on 2022. Comprehensive US planned for 2022.  Latency antibiotic course not complete - See MAR.  Betamethasone Completed given on May / 08 /  2022  & planned for second dose on May / 09 /  2022 .   Interventions: Monitor vital signs and indicators of infection every 4 hours while awake. Continue uterine/fetal assessment continuous per Shamir ORTEGA order.  BPP/doppler every Monday.  Activity level: Advance activity as tolerated. Preventive measures include Medications, Positioning, and Frequent voiding. Encourage active range of motion and frequent position changes.  Plan: Continue expectant management. Observe for and notify care provider of indicators of progressing labor, signs/symptoms of infection, or fetal/maternal compromise.

## 2022-05-09 NOTE — DISCHARGE SUMMARY
[facility] Discharge Summary    Jacinta Aguayo MRN# [id number]   Age: 37 year old YOB: 1985     Date of Admission:  2022  Date of Discharge:  22  Admitting Physician:  Yi Boyd MD  Discharge Physician:  Miladys López MD     Admission Diagnosis:  PPROM  Di di twin pregnancy  Short cervix   Conception via IVF    Discharge Diagnosis:   labor  Postpartum hemorrhage   Same as above    Procedures:  Primary classical  section    Consultations:  NICU,     Medications prior to admission:  Medications Prior to Admission   Medication Sig Dispense Refill Last Dose     phentermine (ADIPEX-P) 37.5 mg tablet [PHENTERMINE (ADIPEX-P) 37.5 MG TABLET] Take 1/2 to 1 tablet in the morning. 90 tablet 1      [DISCONTINUED] progesterone (ENDOMETRIN) 100 MG vaginal insert Place 2 suppositories (200 mg) vaginally At Bedtime 60 suppository 4 2022 at 2300         Brief History of Presentation:  Jacinta Aguayo is a 37 year old  at 27w2d by IVF dating, who presents with contractions and during her triage evaluation was noted to be grossly ruptured and was thus recommended to be admitted until delivery.       Hospital Course:    Jacinta Aguayo is a 37 year old  at 27w2d by IVF dating, who presented with contractions and during her triage evaluation was noted to be grossly ruptured and was thus admitted until delivery. Pregnancy was complicated by IVF pregnancy, di/di twin pregnancy, threatened  labor, GBS positive and shortened cervix. She was admitted to antepartum unit for expectant management while monitoring for signs of triple I, vaginal bleeding, non-reassuring fetal status, or signs of labor. She was started on latency antibiotics and a rescue course of betamethasone (s/p a course of -) and her PTA PV progesterone was held due to rupture of membranes. Growth US on HD#2 showed a normal amount of AF around each fetus with fetus A's MVP  at 5.0 cm and Fetus B's at 7.3 cm. Both twins showed normal growth parameters and estimated fetal weight.    On HD#2 she began to note stronger and more frequent contractions. SSE and SVE performed and consistent with  labor. Magnesium load followed by delivery via  section was recommended.    Intraoperative Course:  Surgery was notable for a postpartum hemorrhage due to atony and bleeding from the hysterotomy. EBL 1500.     Operative findings:   - Fetus A               - A single liveborn male in the cephalic position weight and apgars pending.               - No nuchal cord. Clear amniotic fluid.   - Fetus B               - A single liveborn female in the transverse position weight and apgars pending.               - No nuchal cord. Clear amniotic fluid.   - Normal appearing uterus, fallopian tubes, ovaries. Omental adhesion to the left adnexal and uterine fundus which was left in place.  - No intraabdominal or recto-fascial adhesions.    Postoperative Course:  Her postpartum course was uncomplicated. On POD#3 she was meeting all goals for discharge. She did receive IV iron prior to discharge.     Discharge Instructions:  Call or present to the hospital with fevers, chills, worsening abdominal pain, heavy vaginal bleeding, headache, vision changes, chest pain, shortness of breath, worsening lower extremity swelling.    Follow up:  Follow up in 6 weeks with your primary OBGYN and 2 weeks for incision check at [facility]      Discharge Medications:  Current Discharge Medication List      START taking these medications    Details   acetaminophen (TYLENOL) 325 MG tablet Take 2 tablets (650 mg) by mouth every 6 hours as needed for mild pain Start after Delivery.  Qty: 100 tablet, Refills: 0    Associated Diagnoses: S/P  section      ferrous sulfate (FEROSUL) 325 (65 Fe) MG tablet Take 1 tablet (325 mg) by mouth daily (with breakfast)  Qty: 90 tablet, Refills: 1    Associated Diagnoses: S/P   section      ibuprofen (ADVIL/MOTRIN) 600 MG tablet Take 1 tablet (600 mg) by mouth every 6 hours as needed for moderate pain Start after delivery  Qty: 60 tablet, Refills: 0    Associated Diagnoses: S/P  section      senna-docusate (SENOKOT-S/PERICOLACE) 8.6-50 MG tablet Take 1 tablet by mouth daily Start after delivery.  Qty: 100 tablet, Refills: 0    Associated Diagnoses: S/P  section         CONTINUE these medications which have NOT CHANGED    Details   phentermine (ADIPEX-P) 37.5 mg tablet [PHENTERMINE (ADIPEX-P) 37.5 MG TABLET] Take 1/2 to 1 tablet in the morning.  Qty: 90 tablet, Refills: 1    Associated Diagnoses: Polycystic ovary syndrome; Obesity (BMI 30-39.9); Insulin resistance         STOP taking these medications       progesterone (ENDOMETRIN) 100 MG vaginal insert Comments:   Reason for Stopping:               Jailene Barksdale MD  Obstetrics and Gynecology, PGY-3  22. 6:13 AM.    Appreciate note by Dr. Barksdale. Patient has been seen and examined by me separate from the resident, agree with above note.     Miladys López MD  2:40 PM

## 2022-05-09 NOTE — PROGRESS NOTES
Maternal Fetal Medicine Antepartum Progress Note  DOS: 05/09/2022  MRN: [id number]    S: Patient feeling well today. Complaining of some discomfort from a cramp in her left calf that has been present for a few days. No bleeding. Contractions: feeling them every 15 minutes. Leakage of fluid: some continuous leaking since rupture.  Denies headaches, vision changes, chest pain or shortness of breath. Normal fetal movement. No other acute concerns.    O:   Patient Vitals for the past 24 hrs:   BP Temp Temp src Resp SpO2   05/09/22 0642 127/78 98.2  F (36.8  C) Oral 16 --   05/09/22 0230 106/57 98.4  F (36.9  C) Oral 18 --   05/09/22 0208 -- -- -- -- 96 %   05/09/22 0110 -- 98.2  F (36.8  C) Oral 20 --   05/09/22 0102 -- -- -- -- 96 %   05/08/22 2250 -- -- -- -- 98 %   05/08/22 2245 110/58 98.2  F (36.8  C) Oral 22 97 %   05/08/22 2130 -- 98.3  F (36.8  C) Oral 20 --   05/08/22 2000 -- 98.3  F (36.8  C) Oral 20 --   05/08/22 1855 128/56 -- -- -- --   05/08/22 1755 117/55 -- -- -- --   05/08/22 1655 112/57 -- -- 18 --   05/08/22 1540 117/65 -- -- -- --   05/08/22 1524 120/70 -- -- -- --   05/08/22 1501 -- -- -- -- 97 %   05/08/22 1456 110/61 98  F (36.7  C) Oral 22 97 %   05/08/22 1451 108/56 -- -- -- 97 %   05/08/22 1446 116/58 -- -- -- 97 %   05/08/22 1200 118/71 -- -- -- --   05/08/22 1158 -- 98.2  F (36.8  C) Oral 18 --     Gen: NAD  Resp: nonlabored breathing on room air  CV: regular rate, well-perfused  Abdomen: soft, gravid, nontender  Extremities: left calf tender, trace edema b/l, symmetric     Fetus A  FHT: 140 baseline, moderate variability, +accelerations, no decelerations    Fetus B  FHT: 135 baseline, moderate variability, +accelerations, no decelerations    [place]: rare contractions    Labs/imaging:   Results for orders placed or performed during the hospital encounter of 05/08/22 (from the past 24 hour(s))   Rupture of Fetal Membranes by ROM Plus   Result Value Ref Range    Rupture of Fetal Membranes by ROM  Plus Positive (A) Negative, Invalid, Suggest Repeat    Narrative    It is recommended that the tests to detect rupture of the amniotic membranes should not be used without other clinical assessments to make clinical patient management decision.   CBC with platelets differential    Narrative    The following orders were created for panel order CBC with platelets differential.  Procedure                               Abnormality         Status                     ---------                               -----------         ------                     CBC with platelets and d...[[id number]]  Abnormal            Final result                 Please view results for these tests on the individual orders.   ABO/Rh type and screen    Narrative    The following orders were created for panel order ABO/Rh type and screen.  Procedure                               Abnormality         Status                     ---------                               -----------         ------                     Adult Type and Screen[[id number]]                            Final result                 Please view results for these tests on the individual orders.   Wet prep    Specimen: Urethra; Swab   Result Value Ref Range    Trichomonas Absent Absent    Yeast Absent Absent    Clue Cells Absent Absent    WBCs/high power field 2+ (A) None   CBC with platelets and differential   Result Value Ref Range    WBC Count 14.3 (H) 4.0 - 11.0 10e3/uL    RBC Count 3.83 3.80 - 5.20 10e6/uL    Hemoglobin 10.0 (L) 11.7 - 15.7 g/dL    Hematocrit 31.0 (L) 35.0 - 47.0 %    MCV 81 78 - 100 fL    MCH 26.1 (L) 26.5 - 33.0 pg    MCHC 32.3 31.5 - 36.5 g/dL    RDW 15.1 (H) 10.0 - 15.0 %    Platelet Count 169 150 - 450 10e3/uL    % Neutrophils 73 %    % Lymphocytes 17 %    % Monocytes 9 %    % Eosinophils 0 %    % Basophils 0 %    % Immature Granulocytes 1 %    NRBCs per 100 WBC 0 <1 /100    Absolute Neutrophils 10.4 (H) 1.6 - 8.3 10e3/uL    Absolute Lymphocytes 2.5 0.8 -  5.3 10e3/uL    Absolute Monocytes 1.2 0.0 - 1.3 10e3/uL    Absolute Eosinophils 0.0 0.0 - 0.7 10e3/uL    Absolute Basophils 0.0 0.0 - 0.2 10e3/uL    Absolute Immature Granulocytes 0.2 <=0.4 10e3/uL    Absolute NRBCs 0.0 10e3/uL   Adult Type and Screen   Result Value Ref Range    ABO/RH(D) B POS     Antibody Screen Negative Negative    SPECIMEN EXPIRATION DATE     UA reflex to Microscopic and Culture    Specimen: Urine, Clean Catch   Result Value Ref Range    Color Urine Light Yellow Colorless, Straw, Light Yellow, Yellow    Appearance Urine Slightly Cloudy (A) Clear    Glucose Urine 30  (A) Negative mg/dL    Bilirubin Urine Negative Negative    Ketones Urine Negative Negative mg/dL    Specific Gravity Urine 1.008 1.003 - 1.035    Blood Urine Negative Negative    pH Urine 6.0 5.0 - 7.0    Protein Albumin Urine Negative Negative mg/dL    Urobilinogen Urine Normal Normal, 2.0 mg/dL    Nitrite Urine Negative Negative    Leukocyte Esterase Urine Large (A) Negative    Bacteria Urine Few (A) None Seen /HPF    Mucus Urine Present (A) None Seen /LPF    Amorphous Crystals Urine Few (A) None Seen /HPF    RBC Urine 4 (H) <=2 /HPF    WBC Urine 68 (H) <=5 /HPF    Squamous Epithelials Urine 5 (H) <=1 /HPF    Narrative    Urine Culture ordered based on laboratory criteria   Drug abuse scrn 7 UR (/) (RH, SH, UR)   Result Value Ref Range    Amphetamines Urine Screen Negative Screen Negative    Cannabinoids Urine Screen Negative Screen Negative    Cocaine Urine Screen Negative Screen Negative    Opiates Urine Screen Negative Screen Negative    PCP Urine Screen Negative Screen Negative   Asymptomatic COVID-19 Virus (Coronavirus) by PCR Nasopharyngeal    Specimen: Nasopharyngeal; Swab   Result Value Ref Range    SARS CoV2 PCR Negative Negative    Narrative    Testing was performed using the [employer]  SARS-CoV-2 & Influenza A/B Assay on the [employer]  [employer]  System.  This test should be ordered for the detection of  SARS-COV-2 in individuals who meet SARS-CoV-2 clinical and/or epidemiological criteria. Test performance is unknown in asymptomatic patients.  This test is for in vitro diagnostic use under the FDA EUA for laboratories certified under CLIA to perform moderate and/or high complexity testing. This test has not been FDA cleared or approved.  A negative test does not rule out the presence of PCR inhibitors in the specimen or target RNA in concentration below the limit of detection for the assay. The possibility of a false negative should be considered if the patient's recent exposure or clinical presentation suggests COVID-19.  [facility] are certified under the Clinical Laboratory Improvement Amendments of 1988 (CLIA-88) as qualified to perform moderate and/or high complexity laboratory testing.   [facility] F/U    Narrative            Comp Follow Up  ---------------------------------------------------------------------------------------------------------  Pat. Name: LITO LAURENT       Study Date:  2022 8:01am  Pat. NO:  [id number]        Referring  MD: BRIGID FONG  Site:  [facility]       Sonographer: Bell Reynoso RDMS   :  1985        Age:   37  ---------------------------------------------------------------------------------------------------------    INDICATION  ---------------------------------------------------------------------------------------------------------  Dichorionic, Diamniotic Twin gestation.  Premature Rupture of Membranes (PPROM).      METHOD  ---------------------------------------------------------------------------------------------------------  [facility] ANTEPARTUM inpatient exam. Transabdominal ultrasound examination. View: Sufficient.      PREGNANCY  ---------------------------------------------------------------------------------------------------------  Twin pregnancy. Dichorionic-diamniotic. Number of fetuses: 2  Membrane Description: thick  dividing membrane visualized between fetuses 1 and 2      DATING  ---------------------------------------------------------------------------------------------------------                                           Date                                Details                                                                                      Gest. age                      ALISON  Conception                                                               Conception: IVF  Embryo transfer                  11/15/2021                       IVF / ET: 3 d                                                                              27 w + 3 d                     8/5/2022  Prior assessment               1/17/2022                         GA: 12 w + 0 d                                                                          28 w + 0 d                     8/1/2022  U/S Fetus 1                       5/9/2022                          based upon AC, BPD, Femur, HC                                                 27 w + 3 d                     8/5/2022  U/S Fetus 2                                                              based upon AC, BPD, Femur, HC                                                28 w + 0 d                     8/1/2022  Assigned dating                  Dating performed on 04/6/2022, based on the IVF / ET date                                                  27 w + 3 d                     8/5/2022      Fetus 1: GENERAL EVALUATION  ---------------------------------------------------------------------------------------------------------  Cardiac activity present.  bpm.  Fetal movements present.  Presentation cephalic, midline. Presenting.  Placenta Anterior, No Previa, > 2 cm from internal os, thick dividing membrane.  Umbilical cord 3 vessel cord.  Amniotic fluid Amount of AF: normal. MVP 5.0 cm.      Fetus 2: GENERAL  EVALUATION  ---------------------------------------------------------------------------------------------------------  Cardiac activity present.  bpm.  Fetal movements present.  Presentation tranverse with head to maternal left. Superior.  Placenta Posterior, No Previa, > 2 cm from internal os, thick dividing membrane.  Umbilical cord 3 vessel cord.  Amniotic fluid Amount of AF: normal. MVP 7.3 cm.      Fetus 1: FETAL BIOMETRY  ---------------------------------------------------------------------------------------------------------  Main Fetal Biometry:  BPD                                        68.1                    mm                         27w 3d                Hadlock  OFD                                        86.9                    mm                         26w 0d                Nicolaides  HC                                          249.9                  mm                          27w 1d                Hadlock  AC                                          239.6                  mm                          28w 2d        68%        Hadlock  Femur                                      49.3                   mm                          26w 4d                Hadlock  Fetal Weight Calculation:  EFW                                       1,089                  g                                     41%        Hadlock  EFW (lb,oz)                             2 lb 6                  oz  EFW by                                   Hadlock (BPD-HC-AC-FL)  EFW discordance                     5.6                     %  Head / Face / Neck Biometry:                                             8.2                     mm      Fetus 2: FETAL BIOMETRY  ---------------------------------------------------------------------------------------------------------  Main Fetal Biometry:  BPD                                        70.3                    mm                         28w 2d                Hadlock  OFD                                         92.2                    mm                         27w 2d                Nicolaides  HC                                          258.8                  mm                          28w 1d                Hadlock  Cerebellum tr                            34.1                   mm                          29w 4d                Nicolaides  AC                                          241.9                  mm                          28w 3d        73%        Hadlock  Femur                                      50.5                   mm                          27w 1d                Hadlock  Fetal Weight Calculation:  EFW                                       1,153                  g                                     58%        Hadlock  EFW (lb,oz)                             2 lb 9                  oz  EFW by                                   Hadlock (BPD-HC-AC-FL)  EFW discordance                     5.6                     %  Head / Face / Neck Biometry:                                             6.4                     mm  CM                                          7.7                     mm      Fetus 1: FETAL ANATOMY  ---------------------------------------------------------------------------------------------------------  The following structures appear normal:  Head / Neck                         Cranium. Head size. Head shape. Lateral ventricles. Midline falx. Thalami.  Face                                   Lips. Profile. Nose.  Heart / Thorax                      4-chamber view. RVOT view. LVOT view. 3-vessel-trachea view.                                             Diaphragm.  Abdomen                             Stomach. Kidneys. Bladder. Genitals.  Extremities / Skeleton          Left hand. Right foot.    The following structures could not be adequately visualized:  Heart / Thorax                      Aortic arch view.  Extremities / Skeleton          Right hand.    The following  structures were documented previously:  Head / Neck                         Cavum septi pellucidi. Cerebellum. Cisterna magna.  Spine                                  Cervical spine. Thoracic spine. Lumbar spine. Sacral spine.    Gender: male.      Fetus 2: FETAL ANATOMY  ---------------------------------------------------------------------------------------------------------  The following structures appear normal:  Head / Neck                         Cranium. Head size. Head shape. Lateral ventricles. Midline falx. Cavum septi pellucidi. Cerebellum. Cisterna magna. Thalami.  Face                                   Lips. Profile. Nose.  Heart / Thorax                      4-chamber view. RVOT view.                                             Diaphragm.  Abdomen                             Stomach. Kidneys. Bladder.  Spine                                  Cervical spine. Thoracic spine. Lumbar spine. Sacral spine.  Extremities / Skeleton          Right hand.    The following structures could not be adequately visualized:  Extremities / Skeleton          Left hand.    The following structures were documented previously:  Heart / Thorax                      LVOT view. 3-vessel-trachea view.    Gender: female.      MATERNAL STRUCTURES  ---------------------------------------------------------------------------------------------------------  Cervix                                  Suboptimal  Right Ovary                          Not examined  Left Ovary                            Not examined      RECOMMENDATION  ---------------------------------------------------------------------------------------------------------  We discussed the findings on today's ultrasound with the patient.    Jacinta was seen for an inpatient visit in conjunction with the ultrasound today. Please see the EPIC chart for further details.     surveillance with twice weekly BPP is recommended to begin at 28 weeks gestation given the  diagnosis of PPROM. A repeat assessment of fetal growth is  recommended in three weeks.    Thank-you for the opportunity to participate in the care of this patient. If you have questions regarding today's evaluation or if we can be of further service, please contact the  [facility].    **Fetal anomalies may be present but not detected**        Impression    IMPRESSION  ---------------------------------------------------------------------------------------------------------  1) Diamniotic dichorionic twin pregnancy at 27w 3d gestational age.  2) None of the anomalies commonly detected by ultrasound were evident in the fetal anatomic survey described above in either twin.  3) Growth parameters and estimated fetal weight were consistent with an appropriate for gestation age pattern of growth in both twins. The intertwin discordance is within  normal limits.  4) The amniotic fluid volume appeared normal around both twins.       US Lower Extremity Venous Duplex Left    Narrative    EXAMINATION: US LOWER EXTREMITY VENOUS DUPLEX LEFT 2022 10:52 AM      CLINICAL HISTORY: Left calf pain, torn pregnancy, antepartum  admission. Rule out DVT    COMPARISON: None        PROCEDURE COMMENTS: Ultrasound was performed of the deep venous system  of the left lower extremity using grayscale, color, and spectral  Doppler.    FINDINGS:  The common femoral, greater saphenous origin, femoral, popliteal, and  deep calf veins are visualized and are patent. Venous waveforms are  normal. Normal response to compression.        Impression    IMPRESSION:  No deep vein thrombosis in the left lower extremity.    JORDYN MORENO MD         SYSTEM ID:  [id number]       Assessment and Plan:  37 year old  at 27w3d with DCDA twins now HD#2 admitted for PPROM. Pregnancy complicated by threatened  labor s/p antepartum amdmission, GBS positive, and short cervix.    PPROM at 1250 on 22 at 27w2d   - ROM confirmed by  positive pooling on speculum exam corroborated by positive ROMplus   - Will complete rescue BMZ course today   - D#2/ latency antibiotics   - Work up thus far unremarkable with normal wet prep. GC/CT, UCx pending.   - Currently no contraindication to ongoing expectant management. Plan for delivery at 34w0d unless indicated sooner due to labor, infection, placental abruption, fetal status, etc.   -T&S q72h    FWB  DCDA Twins  - FHT appropriate for gestational age x2  - Continue TID fetal monitoring   - S/p betamethasone course -, rescue course started   - Would resume IV magnesium sulfate with 6g loading dose for fetal neuroprotection if imminent delivery anticipated prior to 32w0d  - NICU, SW, child life consults placed today   - Normal fetal growth for both twins today. Continue Q3 week growth US. Will begin twice weekly BPPs at 28 weeks.     Delivery Planning  - Discussed that  delivery would be recommended at this gestational age if delivery were indicated. The patient is currently not a candidate for breech extraction of Twin B given gestational age, fetal size <1500g. Patient has been consented for primary  section.   - Will eaddress delivery plan if patient remains pregnant and becomes a suitable candidate for vaginal delivery     Short cervix  - Hold PTA PV progesterone in the context of ruptured membranes     Prenatal care  - Rh pos, Antibody negative, Rubella immune, Hep B SAg NR, RPR neg, HIV NR, GC/CT neg  - GCT not yet completed. Will need to delay until 2 weeks s/p rescue betamethasone.   - GBS positive    Dispo: Inpatient through delivery     Patient seen and discussed with Dr. Boyd who is in agreement with jared Quiles, MS4  [facility]    Resident/Fellow Attestation   I, Lenny Jones MD, was present with the medical student who participated in the service and in the documentation of the note.  I have verified the history and personally  performed the physical exam and medical decision making.  I have made necessary edits and agree with the assessment and plan of care as documented in the note.     Key findings: 37 year old  at 27w3d with DCDA Twins admitted with PPROM. Fetal status reassuring x2. Continue expectant management.     Lenny Jones MD  Ob/Gyn Resident, PGY-3  2022 12:16 PM     Physician Attestation   I, Yi Boyd MD, saw this patient with the resident and medical student and agree with the resident and medical student's findings and plan of care as documented in the note.      I personally reviewed vital signs, medications, labs and imaging.    Key findings: See admit H&P for documentation    Yi Boyd MD  Date of Service (when I saw the patient): 22

## 2022-05-09 NOTE — CONSULTS
[facility]                Neonatology Antepartum Counseling Consult:  I was asked to provide antepartum counseling for Jacinta Aguayo at the request of Yi Boyd MD secondary to PPROM and PTL of di-di twin gestation. Ms. Jacinta Aguayo is currently 27 weeks/ 3 days gestation and has a history significant for:  Patient Active Problem List   Diagnosis     Infertility associated with anovulation     Complex endometrial hyperplasia with atypia     Cervical cancer screening     Polycystic ovary syndrome     Obesity (BMI 30-39.9)     Menstrual disorder     Acanthosis nigricans     Foot pain     Insulin resistance     Pre-diabetes     Dyslipidemia     Metabolic syndrome X      contractions     Encounter for triage in pregnant patient      premature rupture of membranes (PPROM) with unknown onset of labor      Betamethasone was administered on  &  with rescue dose on .   Ms. Jacinta Aguayo, accompanied by her  and a grandmother, was counseled on the expected hospital course, potential risks, and outcomes associated with an infant born at this gestation. The counseling included: morbidity, mortality, initial delivery room stabilization, respiratory course, lung development, patent ductus arteriosus, retinopathy of prematurity, hyperbilirubinemia, hemodynamic support, infection (including NEC), intraventricular hemorrhage, nutrition, growth and development, and long term outcomes. I discussed donor breast milk, umbilical line & PICC line placement.  I also explained the basic four criteria for discharge: that the baby had to be free of apnea; able to maintain their body temperature; able to feed by bottle or breast well enough to; attain an adequate pattern of weight gain and growth.  The patient had no remaining questions but was encouraged to contact the NICU via their caregivers should any arise.  Please feel free to call and thank you for  involving the NICU team in the care of your patient.      Floor Time (min): 5  Face to Face Time (min): 45  Total Time (minutes): 50  More than 50% of my time was spent in direct, face to face, antepartum counseling with the above patient.    Joya LOCKETT, CNP 5/9/2022, 2:28 PM

## 2022-05-10 LAB
BACTERIA UR CULT: NO GROWTH
ERYTHROCYTE [DISTWIDTH] IN BLOOD BY AUTOMATED COUNT: 15.5 % (ref 10–15)
HCT VFR BLD AUTO: 27.2 % (ref 35–47)
HGB BLD-MCNC: 8.4 G/DL (ref 11.7–15.7)
MCH RBC QN AUTO: 25.9 PG (ref 26.5–33)
MCHC RBC AUTO-ENTMCNC: 30.9 G/DL (ref 31.5–36.5)
MCV RBC AUTO: 84 FL (ref 78–100)
PLATELET # BLD AUTO: 172 10E3/UL (ref 150–450)
RBC # BLD AUTO: 3.24 10E6/UL (ref 3.8–5.2)
T PALLIDUM AB SER QL: NONREACTIVE
WBC # BLD AUTO: 16.2 10E3/UL (ref 4–11)

## 2022-05-10 PROCEDURE — 250N000013 HC RX MED GY IP 250 OP 250 PS 637: Performed by: STUDENT IN AN ORGANIZED HEALTH CARE EDUCATION/TRAINING PROGRAM

## 2022-05-10 PROCEDURE — 258N000003 HC RX IP 258 OP 636: Performed by: STUDENT IN AN ORGANIZED HEALTH CARE EDUCATION/TRAINING PROGRAM

## 2022-05-10 PROCEDURE — 36415 COLL VENOUS BLD VENIPUNCTURE: CPT | Performed by: STUDENT IN AN ORGANIZED HEALTH CARE EDUCATION/TRAINING PROGRAM

## 2022-05-10 PROCEDURE — 250N000011 HC RX IP 250 OP 636: Performed by: STUDENT IN AN ORGANIZED HEALTH CARE EDUCATION/TRAINING PROGRAM

## 2022-05-10 PROCEDURE — 85027 COMPLETE CBC AUTOMATED: CPT | Performed by: STUDENT IN AN ORGANIZED HEALTH CARE EDUCATION/TRAINING PROGRAM

## 2022-05-10 PROCEDURE — 120N000002 HC R&B MED SURG/OB UMMC

## 2022-05-10 RX ORDER — SODIUM CHLORIDE, SODIUM LACTATE, POTASSIUM CHLORIDE, CALCIUM CHLORIDE 600; 310; 30; 20 MG/100ML; MG/100ML; MG/100ML; MG/100ML
INJECTION, SOLUTION INTRAVENOUS
Status: DISPENSED
Start: 2022-05-10 | End: 2022-05-11

## 2022-05-10 RX ORDER — DIPHENHYDRAMINE HYDROCHLORIDE 50 MG/ML
50 INJECTION INTRAMUSCULAR; INTRAVENOUS
Status: DISCONTINUED | OUTPATIENT
Start: 2022-05-10 | End: 2022-05-12 | Stop reason: HOSPADM

## 2022-05-10 RX ORDER — METHYLPREDNISOLONE SODIUM SUCCINATE 125 MG/2ML
125 INJECTION, POWDER, LYOPHILIZED, FOR SOLUTION INTRAMUSCULAR; INTRAVENOUS
Status: DISCONTINUED | OUTPATIENT
Start: 2022-05-10 | End: 2022-05-12 | Stop reason: HOSPADM

## 2022-05-10 RX ADMIN — DIPHENHYDRAMINE HYDROCHLORIDE 25 MG: 25 CAPSULE ORAL at 12:19

## 2022-05-10 RX ADMIN — ACETAMINOPHEN 975 MG: 325 TABLET ORAL at 22:58

## 2022-05-10 RX ADMIN — ACETAMINOPHEN 975 MG: 325 TABLET ORAL at 16:24

## 2022-05-10 RX ADMIN — IBUPROFEN 800 MG: 800 TABLET, FILM COATED ORAL at 21:19

## 2022-05-10 RX ADMIN — KETOROLAC TROMETHAMINE 30 MG: 30 INJECTION, SOLUTION INTRAMUSCULAR at 02:43

## 2022-05-10 RX ADMIN — IRON SUCROSE 300 MG: 20 INJECTION, SOLUTION INTRAVENOUS at 10:23

## 2022-05-10 RX ADMIN — SIMETHICONE 80 MG: 80 TABLET, CHEWABLE ORAL at 22:58

## 2022-05-10 RX ADMIN — DIPHENHYDRAMINE HYDROCHLORIDE 25 MG: 25 CAPSULE ORAL at 19:06

## 2022-05-10 RX ADMIN — KETOROLAC TROMETHAMINE 30 MG: 30 INJECTION, SOLUTION INTRAMUSCULAR at 08:26

## 2022-05-10 RX ADMIN — SODIUM CHLORIDE, POTASSIUM CHLORIDE, SODIUM LACTATE AND CALCIUM CHLORIDE 500 ML: 600; 310; 30; 20 INJECTION, SOLUTION INTRAVENOUS at 14:04

## 2022-05-10 RX ADMIN — KETOROLAC TROMETHAMINE 30 MG: 30 INJECTION, SOLUTION INTRAMUSCULAR at 14:42

## 2022-05-10 RX ADMIN — DIPHENHYDRAMINE HYDROCHLORIDE 25 MG: 25 CAPSULE ORAL at 05:42

## 2022-05-10 RX ADMIN — OXYCODONE HYDROCHLORIDE 5 MG: 5 TABLET ORAL at 16:33

## 2022-05-10 RX ADMIN — ACETAMINOPHEN 975 MG: 325 TABLET ORAL at 04:12

## 2022-05-10 RX ADMIN — SIMETHICONE 80 MG: 80 TABLET, CHEWABLE ORAL at 16:32

## 2022-05-10 RX ADMIN — ACETAMINOPHEN 975 MG: 325 TABLET ORAL at 10:23

## 2022-05-10 RX ADMIN — SODIUM CHLORIDE, SODIUM LACTATE, POTASSIUM CHLORIDE, CALCIUM CHLORIDE AND DEXTROSE MONOHYDRATE: 5; 600; 310; 30; 20 INJECTION, SOLUTION INTRAVENOUS at 11:58

## 2022-05-10 RX ADMIN — SENNOSIDES AND DOCUSATE SODIUM 1 TABLET: 8.6; 5 TABLET ORAL at 01:01

## 2022-05-10 RX ADMIN — SENNOSIDES AND DOCUSATE SODIUM 1 TABLET: 8.6; 5 TABLET ORAL at 07:40

## 2022-05-10 NOTE — PROVIDER NOTIFICATION
05/09/22 1645   Provider Notification   Provider Name/Title Dr. Boyd   Method of Notification In Department   Request Evaluate - Remote   Notification Reason Uterine Activity     Pt reports 9 contractions from [id number], put on monitor and having contractions every 5-6 minutes. Moderately uncomfortable.   Response: Leave on monitor, will assess.

## 2022-05-10 NOTE — BRIEF OP NOTE
Brief Operative Note   Name: Jacinta Aguayo  MRN: [id number]  : 1985  Date of Surgery: 2022    Pre-operative Diagnosis:    at 27w2d  IVF pregnancy  PPROM   labor  Di-di twins  Post-operative Diagnosis:   Same, now   Procedure(s): Primary classical  section with triple layer uterine closure via Pfannenstiel skin incision    Surgeon:  Jdae Trujillo MD   Assistants:  Jailene Barksdale MD, PGY-3    Martha Elizondo MD, PGY-2  Hitesh Quiles, MS4    Anesthesia: Spinal  QBL: 2064 mL   Urine Output: 150 mL clear urine   Fluids: 1000 mL crystalloid  Medications: Ancef 2g, Azithromycin 500mg, Pitocin 30U, 1000mg TXA, 0.2mg IM methergine, 250mcg IM hemabate, 800mcg NH misoprostol  Drains: Goyal    Specimens: Cord blood x2, cord segment x2, placenta x2  Complications: None apparent.  Findings:  - Fetus A   - A single liveborn male in the cephalic position weight and apgars pending.   - No nuchal cord. Clear amniotic fluid.   - Fetus B   - A single liveborn female in the transverse position weight and apgars pending.   - No nuchal cord. Clear amniotic fluid.   - Normal appearing uterus, fallopian tubes, ovaries. Small omental adhesion to the uterine fundus.    - No intraabdominal or recto-fascial adhesions.    Martha Elizondo MD  Obstetrics & Gynecology, PGY-2  2022 8:16 PM

## 2022-05-10 NOTE — ANESTHESIA PROCEDURE NOTES
Intrathecal injection Procedure Note    Pre-Procedure   Staff -        Anesthesiologist:  Leti Miller MD       Performed By: resident       Location: OB       Pre-Anesthestic Checklist: patient identified, IV checked, risks and benefits discussed, informed consent, monitors and equipment checked, pre-op evaluation, at physician/surgeon's request and post-op pain management  Timeout:       Correct Patient: Yes        Correct Procedure: Yes        Correct Site: Yes        Correct Position: Yes   Procedure Documentation  Procedure: intrathecal injection       Patient Position: sitting       Skin prep: Chloraprep       Insertion Site: L3-4. (midline approach).       Needle Gauge: 25.        Needle Length (Inches): 3.5        Spinal Needle Type: Pencan       Introducer used       Introducer: 20 G       # of attempts: 1 and  # of redirects:  0    Assessment/Narrative         CSF fluid: clear.    Medication(s) Administered   0.75% Hyperbaric Bupivacaine (Intrathecal) - Intrathecal   1.6 mL - 5/9/2022 7:22:00 PM  Fentanyl PF (Intrathecal) - Intrathecal   20 mcg - 5/9/2022 7:22:00 PM  Morphine PF 1 mg/mL (Intrathecal) - Intrathecal   0.15 mg - 5/9/2022 7:22:00 PM

## 2022-05-10 NOTE — PLAN OF CARE
Goyal catheter removed this morning. Patient assisted to toilet, able to ambulate with minimal assistance, no urine output at this time. Patient able to do cares independently, using double breastpump but with no milk collected yet. IV saline locked. Will continue with plan of care.

## 2022-05-10 NOTE — ANESTHESIA PROCEDURE NOTES
TAP Procedure Note    Pre-Procedure   Staff -        Anesthesiologist:  Leti Miller MD       Resident/Fellow: Trino López       Performed By: resident       Location: pre-op       Pre-Anesthestic Checklist: patient identified, IV checked, site marked, risks and benefits discussed, informed consent, monitors and equipment checked, pre-op evaluation, at physician/surgeon's request and post-op pain management  Timeout:       Correct Patient: Yes        Correct Procedure: Yes        Correct Site: Yes        Correct Position: Yes        Correct Laterality: Yes        Site Marked: Yes  Procedure Documentation  Procedure: TAP       Diagnosis: POST OPERATIVE PAIN       Laterality: bilateral       Patient Position: supine       Patient Prep/Sterile Barriers: sterile gloves, mask       Skin prep: Chloraprep       Needle Type: short bevel       Needle Gauge: 21.        Needle Length (millimeters): 110        Ultrasound guided       1. Ultrasound was used to identify targeted nerve, plexus, vascular marker, or fascial plane and place a needle adjacent to it in real-time.       2. Ultrasound was used to visualize the spread of anesthetic in close proximity to the above referenced structure.       3. A permanent image is entered into the patient's record.    Assessment/Narrative         The placement was negative for: blood aspirated, painful injection and site bleeding       Paresthesias: No.       Bolus given via needle..        Secured via.        Insertion/Infusion Method: Single Shot       Complications: none       Injection made incrementally with aspirations every 5 mL.    Medication(s) Administered   Bupivacaine 0.25% PF (Infiltration) - Infiltration   20 mL - 5/9/2022 8:35:00 PM  Bupivacaine liposome (Exparel) 1.3% LA inj susp (Infiltration) - Infiltration   20 mL - 5/9/2022 8:35:00 PM

## 2022-05-10 NOTE — OP NOTE
[facility]   OPERATIVE NOTE:  SECTION     Surgery Date:  May 9, 2022  Surgeon(s): Jade Trujillo MD  Assistants:  Jailene Barksdale MD, PGY3; Martha Elizondo MD PGY2    Preoperative Diagnoses:  1.  at 27w3d  2.  premature rupture of membranes  3.  labor  4. Di di twin pregnancy  5. Conception via IVF  6. Obesity    Postoperative diagnoses:  1.  at 27w3d, now delivered  2. Postpartum hemorrhage due to atony and hysterotomy    Procedure performed:  Primary low vertical  section with three layer uterine closure via Pfannenstiel skin incision     Anesthesia:  Spinal with duramorph  Est Blood Loss (mL): 1500 mL  Fluid replacement: 1000 mL crystalloid.   Specimens: Placentas, blood cord, cord segment  Complications: None    Medications: Ancef 2g, Azithromycin 500mg, Pitocin 30U, 1000mg TXA, 0.2mg IM methergine, 250mcg IM hemabate, 800mcg ND misoprostol  Drains: Goyal    Operative findings:   - Fetus A               - A single liveborn male in the cephalic position weight and apgars pending.               - No nuchal cord. Clear amniotic fluid.   - Fetus B               - A single liveborn female in the transverse position weight and apgars pending.               - No nuchal cord. Clear amniotic fluid.   - Normal appearing uterus, fallopian tubes, ovaries. Omental adhesion to the left adnexal and uterine fundus which was left in place.  - No intraabdominal or recto-fascial adhesions.    Indication: Jacinta Aguayo is a 37 year old, , who was admitted at 27w2d by IVF dating due to PPROM. She received a rescue course of betamethasone and was started on magnesium. On HD#2 she developed painful contractions and was noted to be 3-4 cm dilated. A primary  section with possible classical hysterotomy was recommended and she agreed to proceed.     Procedure details:  The patient was taken to the operating room. She received Ancef and  Azithromycin prior to the skin incision. She was placed in the dorsal supine position with a leftward tilt and prepped and draped in the usual sterile fashion.     Following test of adequate spinal anesthesia, the abdomen was entered through a transverse skin incision. The skin incision was made sharply and carried through the subcutaneous tissue to the fascia.  Fascia was incised in the midline and extended laterally with blunt dissection. The muscle was  in the midline.      The peritoneum was entered bluntly and the opening extended by digital dissection with care to avoid the bladder. A bladder blade was placed. A vertical incision was made with the scalpel well into the active segment. It was extended with bandage scissors. Fetus A was noted to be in the OA position. It was elevated to the level of the hysterotomy and was delivered atraumatically, shoulders delivered easily thereafter. The cord was doubly clamped and cut and the infant was handed off to the waiting NICU staff. Amniotomy was then performed for fetus B, which was noted to be in the transverse position. It was delivered with standard breech maneuvers with delivery of the legs, hips, trunk arms and finally head. The cord was doubly clamped and cut and the infant was handed off to the awaiting NICU staff. Cord segments were obtained and cord gases were collected.    The placenta was expressed. The uterus was exteriorized from the abdomen and cleared of all clots and debris.  The uterus was massaged and was noted to be boggy.  Pitocin was given through the running IV. The patient received 1 gram of IV TXA. With vigorous massage as well as administration of pitocin the uterus remained somewhat soft. The patient received 0.2mg IM methergine and 250 mcg IM hemabate. The Ancef was re dosed given EBL of approximately 1500 ml. The hysterotomy was repaired with 0-vicryl suture in a running locked fashion in 3 layers. The serosal was approximated  with 3-0 Vicryl. Good hemostasis was achieved. The bladder flap was inspected and found to be hemostatic.  The posterior cul-de-sac was cleared of all clot and debris and the uterus was returned to the abdomen.      The bilateral pericolic gutters were cleared of all clot and debris.  The hysterotomy was again inspected and found to be hemostatic.  The abdominal wall was examined and also found to be hemostatic.  The fascia was closed with a running suture of 0-Vicryl.  Subcutaneous tissue was irrigated. Areas that were not hemostatic were controlled with cautery. The subcutaneous tissue was greater than 3cm in depth and was re-approximated with 3-0 Vicryl. The skin was closed with 4-0 monocryl. The patient tolerated the procedure well and was taken to the recovery room in stable condition. All sponge, needle and instrument counts were correct x2.     Dr. Trujillo was present for the entire procedure.     Jailene Barksdale MD  Obstetrics and Gyncology, PGY-3  May 9, 2022, 9:15 PM.

## 2022-05-10 NOTE — PLAN OF CARE
Goal Outcome Evaluation:    Plan of Care Reviewed With: patient     Patient arrived to [facility] unit via zoom cart at 2320 ,with belongings, accompanied by spouse/ significant other, twin babies (male and female) at NICU. Received report from Christianne REDDY. Unit and room orientation completd. Call light given; no concerns present at this time. Continue with plan of care.

## 2022-05-10 NOTE — PLAN OF CARE
Problem: Plan of Care -   Goal: Plan of Care Review/Shift Note  5/10/2022 1726 by Neelam Tierney RN  Outcome: Ongoing, Progressing  Problem: Pain (Postpartum  Delivery)  Goal: Acceptable Pain Control  Outcome: Ongoing, Progressing   Goal Outcome Evaluation:  Started taking oxycodone now as she  feels more pain with movement but able to move well in the room. Went down to NICU in wheelchair to see baby. Encouraged to ambulate in  the hallway as tolerated.  Able to void more this time about 175 ml. Advised to drink more. Will continue with plan of care.  Overall Patient Progress: improving

## 2022-05-10 NOTE — ANESTHESIA CARE TRANSFER NOTE
Patient: Jacinta Aguayo    Procedure: Procedure(s):   SECTION       Diagnosis: 27 weeks gestation of pregnancy [Z3A.27]  Diagnosis Additional Information: No value filed.    Anesthesia Type:   Spinal     Note:    Oropharynx: spontaneously breathing  Level of Consciousness: awake  Oxygen Supplementation: room air    Independent Airway: airway patency satisfactory and stable  Dentition: dentition unchanged  Vital Signs Stable: post-procedure vital signs reviewed and stable  Report to RN Given: handoff report given  Patient transferred to: PACU    Handoff Report: Identifed the Patient, Identified the Reponsible Provider, Reviewed the pertinent medical history, Discussed the surgical course, Reviewed Intra-OP anesthesia mangement and issues during anesthesia, Set expectations for post-procedure period and Allowed opportunity for questions and acknowledgement of understanding      Vitals:  Vitals Value Taken Time   /65 22 2045   Temp     Pulse 96 229   Resp 12    SpO2 95 % 22   Vitals shown include unvalidated device data.    Electronically Signed By: Trino Lpóez  May 9, 2022  8:49 PM

## 2022-05-10 NOTE — PLAN OF CARE
Pt reported increase in contractions from , place on monitors at 1610. Pt tia every 5-7 minutes. See provider notification notes. Spec exam done at 1815 and decision made to proceed with . Magnesium loading dose started at 1820. Transferred to OR at 1913. Viable baby boy born via  at 1939 and viable baby girl born via  at 193, NICU in attendance and babies admitted to NICU. Pt to PACU at , PACU recovery WNL, VSS, bleeding stable, pain minimal. Pt transferred to postpartum unit after visiting babies in the NICU.     Data: Jacinta Aguayo transferred to 7125 via cart at 2305. Babies in NICU.  Action: Receiving unit notified of transfer: Yes. Patient and family notified of room change. Report given to receiving RN at 2200. Belongings sent to receiving unit. Accompanied by Registered Nurse. Oriented patient to surroundings. Call light within reach.   Response: Patient tolerated transfer and is stable.

## 2022-05-10 NOTE — PLAN OF CARE
Goal Outcome Evaluation: VSS. Postpartum checks WDL. Incisional drsg CDI. Goyal removed at 0640- pt has attempted to void x2 with only a small amt of urine (about 10ml). Bladder scanned for max 76ml. Pt had low UO overnight and did not receive any IV fluids. Started on maintenance IVF at 125ml/hr and bolus to be ordered per G2 Larry. Pt also encouraged to hydrate orally as well. Pain managed with tylenol and toradol. Pumping for babies in the NICU.

## 2022-05-10 NOTE — PROGRESS NOTES
"Post  Anesthesia Follow Up Note    Patient: Jacinta Aguayo    Patient location: Postpartum floor.    Chief complaint: Acute postoperative pain management s/p intrathecal morphine administration     Procedure(s) Performed:  Procedure(s):   SECTION    Anesthesia type: Spinal Block    Subjective:     Pain Control: \"its fine\" both at rest and with ambulation    Additional ROS:  She does complain of pruritis at this time. She denies weakness, denies paresthesia, denies difficulties breathing, but has not yet voided, denies nausea or vomiting. She is able to ambulate and tolerate a regular diet.    Objective:    Respiratory Function (RR / SpO2 / Airway Patency): Satisfactory    Cardiac Function (HR / Rhythm / BP): Satisfactory    Strength and sensation lower extremities: Normal    Last Vitals: BP 98/63 (BP Location: Right arm)   Pulse 88   Temp 36.8  C (98.2  F) (Oral)   Resp 16   SpO2 97%   Breastfeeding Unknown     Assessment and plan:   Jacinta Aguayo is a 37 year old female  POD #1 s/p   with spinal anesthesia; and single shot TAP nerve block injections with 20 mL bupivacaine 0.25% and 20mL liposome bupivacaine (Exparel) long-acting 1.3% given for postoperative analgesia. Pt is ambulating without difficulty, no weakness or paresthesias. There is no evidence of adverse side effects associated with spinal and nerve block injections. The patient is receiving adequate incisional pain control at this time and we anticipate up to 72 hours of incisional pain control. However, we anticipate that the patient will require opioid/nonopioid analgesics for visceral and muscle pain that is not controlled with local anesthetic.      In brief summary, her post-operative analgesia is adequate today. Further interventional analgesic strategies would be of little utility at this time. Thus, we recommend proceeding with PO analgesics including staggered dosing of NSAIDs (ibuprofen) and acetaminophen, " with a taper of oxycodone.     Thank you for including us in the care for this patient.    Rianna QUESADABS  Anesthesia Resident, PGY4

## 2022-05-10 NOTE — PROVIDER NOTIFICATION
05/09/22 1800   Provider Notification   Provider Name/Title Dr. Barksdale   Method of Notification In Department   Request Evaluate in Person   Notification Reason Uterine Activity     Pt continuing to have contractions, continues to be uncomfortable.   Response: Will be in to evaluate pt.

## 2022-05-10 NOTE — PROVIDER NOTIFICATION
05/09/22 2027   Provider Notification   Provider Name/Title Dr. Barksdale   Method of Notification In Department     Leave antonio in overnight per Dr. Barksdale

## 2022-05-10 NOTE — PROVIDER NOTIFICATION
05/10/22 1156   Provider Notification   Provider Name/Title G2 Larry   Method of Notification Electronic Page   Request Evaluate-Remote   Notification Reason Status Update   FYI pt with low UO. Will hang maintenance IVF after iron finished. Do you want bolus?

## 2022-05-10 NOTE — LACTATION NOTE
"This note was copied from a baby's chart.  D:  I met with Jacinta. She states she is normally in good health, takes no medications, and has no history of breast/chest surgery or trauma. The twins are her first children. She has already started to pump.   I:  I gave her a folder of introductory materials, reviewed physiology of colostrum and milk production, pumping guidelines, and I gave her a log and encouraged her to use it.  I explained how to access the videos \"Hand Expression\" and \"Maximizing Milk Production\"; as well as other helpful books and websites.  We discussed hands-on pumping techniques and usefulness of a hands-free pumping bra; I gave her a belly band to use for pumping bra.  We discussed skin to skin holding and how to reach breastfeeding goals.  We talked about medications during breastfeeding.  She verbalized understanding. She has will verify pump coverage through her insurance company.    A:  Mom has information she needs to initiate her supply.   P:  Will continue to provide lactation support.  Willa Santos, RNC, IBCLC             "

## 2022-05-10 NOTE — PROGRESS NOTES
Post Partum Progress Note  PPD#1    Subjective:  She is resting comfortably in bed this morning. Denies abdominal pain. She is tolerating PO intake. Lochia present and similar to light menses.  Goyal remains in place for close monitoring. She has not ambulated by denies dizziness. She denies headache, changes in vision, nausea/vomiting, chest pain, shortness of breath, RUQ pain, or worsening edema.  Plans to breast feed and pump. Reports babies in the NICU are doing well.     Objective:  Vitals:    05/10/22 0100 05/10/22 0205 05/10/22 0300 05/10/22 0405   BP: 99/59 99/54 96/52 96/48   BP Location: Right arm Right arm Right arm Right arm   Patient Position: Semi-Randhawa's Semi-Randhawa's Semi-Randhawa's Semi-Randhawa's   Cuff Size: Adult Regular Adult Regular Adult Regular    Pulse: 98 100 100 96   Resp: 18 19 18 19   Temp: 98.3  F (36.8  C)   99.4  F (37.4  C)   TempSrc: Oral   Oral   SpO2: 99% 97% 96% 96%     General: NAD, resting comfortably  CV: Regular rate, well perfused.   Pulm: Normal respiratory effort.  Abd: Soft, non-tender, non-distended. Fundus is firm and at the umbilicus.    Incision: Dressing in place. Clean. No shadowing. No surrounding erythema.   Ext: +1 lower extremity edema bilaterally. No calf tenderness.      Intake/Output Summary (Last 24 hours) at 5/10/2022 0621  Last data filed at 5/10/2022 0400  Gross per 24 hour   Intake 1301.67 ml   Output 2539 ml   Net -1237.33 ml   .404cc/kg/hour    Assessment/Plan:  Jacinta Aguayo is a 37 year old  female who is POD#1 s/p PCCS in the setting of PPROM and PTL. Procedure complicated by postpartum hemorrhage    - Encourage routine post-operative goals including ambulation and incentive spirometry  - PNC: Rh positive. Rubella immune. No intervention indicated.  - Pain: controlled on oral medications  - Heme: Hgb 10.0>EBL 1500 (TXA, methergine, hemabate, misoprostol)> 9.2> AM Hgb pending. Will discharge home with iron. Discussed need for possible transfusion  versus IV iron given EBL. Patient amenable.   - GI: continue anti-emetics and stool softeners as needed.  - : Goyal in place. Adequate  UOP. Remove this morning  - Infant: Stable in NICU  - Feeding: Plans on breast feeding and pumping.  - BC: Conceived via IVF    Discharge to home on POD#2-3     Jailene Barksdale MD  Obstetrics and Gyncology, PGY-3  May 10, 2022. 6:21 AM.     /57 (BP Location: Left arm)   Pulse 88   Temp 99.3  F (37.4  C) (Oral)   Resp 16   SpO2 98%   Breastfeeding Unknown   Hemoglobin   Date Value Ref Range Status   05/10/2022 8.4 (L) 11.7 - 15.7 g/dL Final     The patient was seen and examined by me separately from the team.  I have reviewed and agree with the above note.  She is feeling overall well this morning.  She is asymptomatic from her ABLA, however she hasn't been out of bed much at this point.  Babies are stable in the NICU, has started pumping overnight.  Will discuss IV iron vs PRBC transfusion this morning.  Continue routine post op care.      Rhiannon Villalobos MD, FACOG

## 2022-05-11 PROCEDURE — 250N000013 HC RX MED GY IP 250 OP 250 PS 637: Performed by: ANESTHESIOLOGY

## 2022-05-11 PROCEDURE — 120N000002 HC R&B MED SURG/OB UMMC

## 2022-05-11 PROCEDURE — 250N000013 HC RX MED GY IP 250 OP 250 PS 637: Performed by: STUDENT IN AN ORGANIZED HEALTH CARE EDUCATION/TRAINING PROGRAM

## 2022-05-11 RX ORDER — IBUPROFEN 600 MG/1
600 TABLET, FILM COATED ORAL EVERY 6 HOURS PRN
Qty: 60 TABLET | Refills: 0 | Status: SHIPPED | OUTPATIENT
Start: 2022-05-11 | End: 2022-06-30

## 2022-05-11 RX ORDER — AMOXICILLIN 250 MG
1 CAPSULE ORAL DAILY
Qty: 100 TABLET | Refills: 0 | Status: SHIPPED | OUTPATIENT
Start: 2022-05-11 | End: 2022-06-30

## 2022-05-11 RX ORDER — ACETAMINOPHEN 325 MG/1
650 TABLET ORAL EVERY 6 HOURS PRN
Qty: 100 TABLET | Refills: 0 | Status: SHIPPED | OUTPATIENT
Start: 2022-05-11 | End: 2022-06-30

## 2022-05-11 RX ORDER — FERROUS SULFATE 325(65) MG
325 TABLET ORAL
Qty: 90 TABLET | Refills: 1 | Status: SHIPPED | OUTPATIENT
Start: 2022-05-11 | End: 2022-06-30

## 2022-05-11 RX ADMIN — IBUPROFEN 800 MG: 800 TABLET, FILM COATED ORAL at 09:22

## 2022-05-11 RX ADMIN — DIPHENHYDRAMINE HYDROCHLORIDE 25 MG: 25 CAPSULE ORAL at 00:35

## 2022-05-11 RX ADMIN — ACETAMINOPHEN 975 MG: 325 TABLET ORAL at 11:47

## 2022-05-11 RX ADMIN — OXYCODONE HYDROCHLORIDE 5 MG: 5 TABLET ORAL at 06:57

## 2022-05-11 RX ADMIN — IBUPROFEN 800 MG: 800 TABLET, FILM COATED ORAL at 03:12

## 2022-05-11 RX ADMIN — ACETAMINOPHEN 975 MG: 325 TABLET ORAL at 17:34

## 2022-05-11 RX ADMIN — OXYCODONE HYDROCHLORIDE 5 MG: 5 TABLET ORAL at 11:47

## 2022-05-11 RX ADMIN — OXYCODONE HYDROCHLORIDE 5 MG: 5 TABLET ORAL at 20:20

## 2022-05-11 RX ADMIN — ACETAMINOPHEN 975 MG: 325 TABLET ORAL at 05:29

## 2022-05-11 RX ADMIN — IBUPROFEN 800 MG: 800 TABLET, FILM COATED ORAL at 17:34

## 2022-05-11 RX ADMIN — OXYCODONE HYDROCHLORIDE 5 MG: 5 TABLET ORAL at 00:35

## 2022-05-11 NOTE — PROGRESS NOTES
Post Partum Progress Note  PPD#2    Subjective:  She is resting comfortably in bed this morning. Patient is present and slightly worse than yesterday. Does improve with PO pain regimen. She is tolerating PO intake. Lochia present and similar to light menses.  She is voiding without difficulty. Passing flatus. Denies dizziness. She denies headache, changes in vision, nausea/vomiting, chest pain, shortness of breath, RUQ pain, or worsening edema.  Plans to breast feed and pump. Reports babies in the NICU are doing well.     Objective:  Vitals:    05/10/22 1624 05/10/22 2301 22 0809 22 1717   BP: 101/58 99/58 102/63 108/63   BP Location:  Right arm Right arm Right arm   Patient Position:  Sitting  Semi-Randhawa's   Cuff Size:  Adult Regular  Adult Regular   Pulse: 85 87 80 90   Resp: 16 16 16 16   Temp: 98.1  F (36.7  C) 98.5  F (36.9  C) 98.1  F (36.7  C) 98.2  F (36.8  C)   TempSrc: Oral Oral Oral Oral   SpO2: 97%        General: NAD, resting comfortably  CV: Regular rate, well perfused.   Pulm: Normal respiratory effort.  Abd: Soft, non-tender, non-distended. Fundus is firm and at the umbilicus.    Incision: Steri strips in place. Incision appears well approximated. No surrounding erythema or induration   Ext: +1 lower extremity edema bilaterally. No calf tenderness.    Assessment/Plan:  Jacinta Aguayo is a 37 year old  female who is POD#2 s/p PCCS in the setting of PPROM and PTL. Procedure complicated by postpartum hemorrhage. Patient doing well in the postpartum period. Starting to meet goals for discharge.     - Encourage routine post-operative goals including ambulation and incentive spirometry  - PNC: Rh positive. Rubella immune. No intervention indicated.  - Pain: controlled on oral medications  - Heme: Hgb 10.0>EBL 1500 (TXA, methergine, hemabate, misoprostol)> 9.2> 8.4> IV iron. Will discharge home with iron.   - GI: continue anti-emetics and stool softeners as needed.  - : low UOP  resolved.   - Infant: Stable in NICU  - Feeding: Plans on breast feeding and pumping.  - BC: Conceived via IVF    Discharge to home on POD#3     Jailene Barksdale MD  Obstetrics and Gyncology, PGY-3  May 11, 2022. 6:06 AM.     [facility] staff:  Appreciate note by Dr. Barksdale.  I have reviewed, edited, and agree with the note. Attempted x 2 to see pt in her room, she was absent both times.  Vitals reviewed and labs reviewed.       Deisy Sharma MD, FACOG  5/11/2022

## 2022-05-11 NOTE — PLAN OF CARE
Data: Vital signs within normal limits. Postpartum checks within normal limits - see flow record. Patient eating and drinking normally. Patient able to empty bladder independently and is up ambulating. No apparent signs of infection. Patient performing self cares and is pumping for babies in NICU.  Action: Patient medicated during the shift for pain. See MAR. Patient reassessed within 1 hour after each medication and pain was improved - patient stated she was comfortable. Patient education done about plan of care and frequent voiding. See flow record.    Plan of Care Reviewed With: patient     Overall Patient Progress: improving

## 2022-05-11 NOTE — PLAN OF CARE
[id number]:- VSS. Fundus midline, firm, and U/2. Scant lochia. Incision approximated with adhesive strips; no drainage noted. Pain adequately managed with scheduled pain meds. Ambulating independently, tolerating regular diet, and voiding without difficulty. Pumping independently. Visiting  in NICU. Continue with plan of care.

## 2022-05-11 NOTE — PLAN OF CARE
Goal Outcome Evaluation: VSS. Postpartum checks WDL. Up independently, voiding without difficulty. Pain managed with tylenol, ibuprofen, and oxycodone. Incision NELLIE with steri-strips in place- no s/s of infection noted. Pumping for babies in the NICU.

## 2022-05-12 VITALS
DIASTOLIC BLOOD PRESSURE: 68 MMHG | SYSTOLIC BLOOD PRESSURE: 129 MMHG | HEART RATE: 84 BPM | OXYGEN SATURATION: 97 % | TEMPERATURE: 99.1 F | RESPIRATION RATE: 16 BRPM

## 2022-05-12 PROCEDURE — 250N000013 HC RX MED GY IP 250 OP 250 PS 637: Performed by: STUDENT IN AN ORGANIZED HEALTH CARE EDUCATION/TRAINING PROGRAM

## 2022-05-12 PROCEDURE — 250N000013 HC RX MED GY IP 250 OP 250 PS 637: Performed by: ANESTHESIOLOGY

## 2022-05-12 RX ORDER — OXYCODONE HYDROCHLORIDE 5 MG/1
5 TABLET ORAL EVERY 6 HOURS PRN
Qty: 9 TABLET | Refills: 0 | Status: SHIPPED | OUTPATIENT
Start: 2022-05-12 | End: 2022-05-15

## 2022-05-12 RX ADMIN — ACETAMINOPHEN 975 MG: 325 TABLET ORAL at 18:23

## 2022-05-12 RX ADMIN — ACETAMINOPHEN 975 MG: 325 TABLET ORAL at 00:43

## 2022-05-12 RX ADMIN — IBUPROFEN 800 MG: 800 TABLET, FILM COATED ORAL at 00:43

## 2022-05-12 RX ADMIN — OXYCODONE HYDROCHLORIDE 5 MG: 5 TABLET ORAL at 18:23

## 2022-05-12 RX ADMIN — SENNOSIDES AND DOCUSATE SODIUM 2 TABLET: 8.6; 5 TABLET ORAL at 00:43

## 2022-05-12 RX ADMIN — ACETAMINOPHEN 975 MG: 325 TABLET ORAL at 06:51

## 2022-05-12 RX ADMIN — IBUPROFEN 800 MG: 800 TABLET, FILM COATED ORAL at 12:35

## 2022-05-12 RX ADMIN — IBUPROFEN 800 MG: 800 TABLET, FILM COATED ORAL at 06:51

## 2022-05-12 RX ADMIN — SENNOSIDES AND DOCUSATE SODIUM 2 TABLET: 8.6; 5 TABLET ORAL at 08:04

## 2022-05-12 RX ADMIN — IBUPROFEN 800 MG: 800 TABLET, FILM COATED ORAL at 18:23

## 2022-05-12 RX ADMIN — OXYCODONE HYDROCHLORIDE 5 MG: 5 TABLET ORAL at 08:04

## 2022-05-12 RX ADMIN — ACETAMINOPHEN 975 MG: 325 TABLET ORAL at 12:35

## 2022-05-12 RX ADMIN — OXYCODONE HYDROCHLORIDE 5 MG: 5 TABLET ORAL at 00:43

## 2022-05-12 NOTE — PLAN OF CARE
Goal Outcome Evaluation: improving    Data: Vital signs within normal limits. Postpartum checks within normal limits - see flow record. Patient eating and drinking normally. Patient able to empty bladder independently and is up ambulating. Patient visits INFANTS in NICU.  No apparent signs of infection. Patient performing self care.    Action: Patient medicated during the shift for pain. See MAR. Patient reassessed within 1 hour after each medication and pain was improved - patient stated she was comfortable. Patient education done about good self care. See flow record.  Response: Positive attachment behaviors verbalized about infants. Support persons  was not present this shift.   Plan: Anticipate discharge sometime today.  .  Plan of Care Reviewed With: patient     Overall Patient Progress: improving

## 2022-05-12 NOTE — PLAN OF CARE
Goal Outcome Evaluation:      Discharge instructions read and explained to patient, all questions answered. Medications and pump  given to patient. Patient discharged to home with all belongings.

## 2022-05-12 NOTE — PLAN OF CARE
Goal Outcome Evaluation:     Data: VSS and postpartum checks WNL. Patient eating and drinking normally. Patient able to empty bladder independently and up ambulating. Patient performing self care and able to visit infants in NICU .Fundus at 2 cm below U  and firm  without massage.  lochia scant and  no blood clots. Incision open to air and steri strips intact.   Action: Patient taking Oxycodone,  Tylenol and Ibuprofen.for pain and pain tolerable. Encouraged patient to pump  every 2 - 3 hours . Patient education done( education record).   Response: Patient participating in self care. Up and ambulating in room independently. Tolerating regular food.    Plan: Continue with the plan of cares and discharge later today.

## 2022-05-12 NOTE — DISCHARGE INSTRUCTIONS
Postop  Birth Instructions    Activity     Do not lift more than 10 pounds for 6 weeks after surgery.  Ask family and friends for help when you need it.  No driving until you have stopped taking your pain medications (usually two weeks after surgery).  No heavy exercise or activity for 6 weeks.  Don't do anything that will put a strain on your surgery site.  Don't strain when using the toilet.  Your care team may prescribe a stool softener if you have problems with your bowel movements.     To care for your incision:     Keep the incision clean and dry.  Do not soak your incision in water. No swimming or hot tubs until it has fully healed. You may soak in the bathtub if the water level is below your incision.  Do not use peroxide, gel, cream, lotion, or ointment on your incision.  Adjust your clothes to avoid pressure on your surgery site (check the elastic in your underwear for example).     You may see a small amount of clear or pink drainage and this is normal.  Check with your health care provider:     If the drainage increases or has an odor.  If the incision reddens, you have swelling, or develop a rash.  If you have increased pain and the medicine we prescribed doesn't help.  If you have a fever above 100.4 F (38 C) with or without chills when placing thermometer under your tongue.   The area around your incision (surgery wound), will feel numb.  This is normal. The numbness should go away in less than a year.     Keep your hands clean:  Always wash your hands before touching your incision (surgery wound). This helps reduce your risk of infection. If your hands aren't dirty, you may use an alcohol hand-rub to clean your hands. Keep your nails clean and short.    Call your healthcare provider if you have any of these symptoms:     You soak a sanitary pad with blood within 1 hour, or you see blood clots larger than a golf ball.  Bleeding that lasts more than 6 weeks.  Vaginal discharge that smells  bad.  Severe pain, cramping or tenderness in your lower belly area.  A need to urinate more frequently (use the toilet more often), more urgently (use the toilet very quickly), or it burns when you urinate.  Nausea and vomiting.  Redness, swelling or pain around a vein in your leg.  Problems breastfeeding or a red or painful area on your breast.  Chest pain and cough or are gasping for air.  Problems with coping with sadness, anxiety or depression. If you have concerns about hurting yourself or the baby, call your provider immediately.    You have questions or concerns after you return home.                Postpartum Hemorrhage  What is postpartum hemorrhage?  Postpartum hemorrhage is more bleeding than normal after the birth of a baby. About 1 in 100 to 5 in 100 women have postpartum hemorrhage. It is more likely with a  birth. It most often happens after the placenta is delivered, but it can also happen later.   What causes postpartum hemorrhage?  Once a baby is delivered, the uterus normally contracts and pushes out the placenta. After the placenta is delivered, these contractions help put pressure on the bleeding vessels in the area where the placenta was attached. If the uterus does not contract strongly enough, these blood vessels bleed freely. This is the most common cause of postpartum hemorrhage. If small pieces of the placenta stay attached, bleeding is also likely.   Postpartum hemorrhage may also be caused by:  Tear in the cervix or tissues of the vagina  Tear in a blood vessel in the uterus  Bleeding into a hidden tissue area or space in the pelvis. This mass of blood is called a hematoma. It is usually in the vulva or vagina.  Blood clotting disorders  Placenta problems  Who is at risk for postpartum hemorrhage?  Some women are at greater risk for postpartum hemorrhage than others. Conditions that may increase the risk include:  Placental abruption. This is the early detachment of the placenta  from the uterus.  Placenta previa. This is when the placenta covers or is near the opening of the cervix.  Overdistended uterus. This is when the uterus is larger than normal because of too much amniotic fluid or a large baby.  Multiple-baby pregnancy  High blood pressure disorders of pregnancy  Having many previous births  Prolonged labor  Infection  Obesity  Use of forceps or vacuum-assisted delivery  Being of  or  ethnic background  What are the symptoms of postpartum hemorrhage?  These are the most common symptoms of postpartum hemorrhage:  Uncontrolled bleeding  Decreased blood pressure  Increased heart rate  Decrease in the red blood cell count  Swelling and pain in the vagina and nearby area if bleeding is from a hematoma   The symptoms of postpartum hemorrhage may look like other health conditions. Always see your healthcare provider for a diagnosis.  How is postpartum hemorrhage diagnosed?  Your healthcare provider will review your health history and do a physical exam. Lab tests often help with the diagnosis. Other tests may include:  Estimate of how much blood you have lost  Measuring pulse and blood pressure  Red blood cell count  Clotting factors in the blood  How is postpartum hemorrhage treated?  The aim of treatment of postpartum hemorrhage is to find and stop the cause of the bleeding as soon as possible. Treatment may include:  Medicine or uterine massage to stimulate uterine contractions  Removing pieces of the placenta that remain in the uterus  Exam of the uterus and other pelvic tissues, the vagina, and the vulva to look for areas that may need repair  Bakri balloon or a Goyal catheter to put pressure on the bleeding inside the uterus. Your healthcare provider may pack the uterus with sponges and sterile materials. This may be done if a Bakri balloon or Goyal catheter is not available.   Laparotomy. This is surgery to open the abdomen to find the cause of bleeding.  Tying off or  sealing bleeding blood vessels. This is done using uterine compression sutures, special gel, glue, or coils. The surgery is done during a laparotomy.  Hysterectomy. This is surgery to remove the uterus. In most cases, this is a last resort.  Replacing lost blood and fluids is important in treating postpartum hemorrhage. You may quickly be given IV (intravenous) fluids, blood, and blood products to prevent shock. Oxygen may also help.  Postpartum hemorrhage can be quite serious. But when your provider quickly finds and treats the cause of bleeding, you often will be able to recover fully.  What are possible complications of postpartum hemorrhage?  Losing lots of blood quickly can cause a severe drop in your blood pressure. This may lead to shock and death if not treated.  What can I do to prevent postpartum hemorrhage?  It s important to know before delivery what puts you at risk for postpartum hemorrhage. It is important to have emergency care available in case it is needed at the time of delivery and after birth. Early care can reduce the amount of blood loss.  When should I call my healthcare provider?  Most cases of postpartum hemorrhage occur at delivery or soon after. But you may bleed heavily after you go home from the hospital. If this happens, call your healthcare provider right away.  Key points about postpartum hemorrhage  Postpartum hemorrhage is heavy bleeding after the birth of your baby.  Losing lots of blood quickly can cause a severe drop in your blood pressure. It may lead to shock and death if not treated.  The most common cause of postpartum hemorrhage is when the uterus does not contract enough after delivery.  Quickly finding and treating the cause of bleeding can often lead to a full recovery.    Next steps  Tips to help you get the most from a visit to your healthcare provider:  Know the reason for your visit and what you want to happen.  Before your visit, write down questions you want  answered.  Bring someone with you to help you ask questions and remember what your provider tells you.  At the visit, write down the name of a new diagnosis, and any new medicines, treatments, or tests. Also write down any new instructions your provider gives you.  Know why a new medicine or treatment is prescribed, and how it will help you. Also know what the side effects are.  Ask if your condition can be treated in other ways.  Know why a test or procedure is recommended and what the results could mean.  Know what to expect if you do not take the medicine or have the test or procedure.  If you have a follow-up appointment, write down the date, time, and purpose for that visit.  Know how you can contact your provider if you have questions.  [employer] last reviewed this educational content on 3/1/2019      4711-1656 [employer]. All rights reserved. This information is not intended as a substitute for professional medical care. Always follow your healthcare professional's instructions.

## 2022-05-12 NOTE — PROGRESS NOTES
Post Partum Progress Note  PPD#3    Subjective:  She is resting comfortably in bed this morning. Patient is present but overall well controlled with oral pain medications. She is tolerating PO intake. Lochia present and similar to light menses.  She is voiding without difficulty. Passing flatus. Denies dizziness. She denies headache, changes in vision, nausea/vomiting, chest pain, shortness of breath, RUQ pain, or worsening edema.  Plans to breast feed and pump. Reports babies in the NICU are doing well.     Objective:  Vitals:    05/10/22 1624 05/10/22 2301 22 0809 22 1717   BP: 101/58 99/58 102/63 108/63   BP Location:  Right arm Right arm Right arm   Patient Position:  Sitting  Semi-Randhawa's   Cuff Size:  Adult Regular  Adult Regular   Pulse: 85 87 80 90   Resp: 16 16 16 16   Temp: 98.1  F (36.7  C) 98.5  F (36.9  C) 98.1  F (36.7  C) 98.2  F (36.8  C)   TempSrc: Oral Oral Oral Oral   SpO2: 97%        General: NAD, resting comfortably  CV: Regular rate, well perfused.   Pulm: Normal respiratory effort.  Abd: Soft, non-tender, non-distended. Fundus is firm and at the umbilicus.    Incision: Steri strips in place. Incision appears well approximated. No surrounding erythema or induration   Ext: +1 lower extremity edema bilaterally. No calf tenderness.    Assessment/Plan:  Jacinta Aguayo is a 37 year old  female who is POD#3 s/p PCCS in the setting of PPROM and PTL. Procedure complicated by postpartum hemorrhage. Patient doing well in the postpartum period. Meeting goals for discharge.     - Encourage routine post-operative goals including ambulation and incentive spirometry  - PNC: Rh positive. Rubella immune. No intervention indicated.  - Pain: controlled on oral medications  - Heme: Hgb 10.0>EBL 1500 (TXA, methergine, hemabate, misoprostol)> 9.2> 8.4> IV iron. Will discharge home with iron.   - GI: continue anti-emetics and stool softeners as needed.  - : low UOP resolved.   - Infant: Stable in  NICU  - Feeding: Plans on breast feeding and pumping.  - BC: Conceived via IVF    Discharge to home today.     Jailene Barksdale MD  Obstetrics and Gyncology, PGY-3  May 12, 2022. 6:11 AM.    Appreciate note by Dr. Barksdale. Patient has been seen and examined by me separate from the resident, agree with above note.     Miladys López MD  2:40 PM

## 2022-05-20 ENCOUNTER — TRANSFERRED RECORDS (OUTPATIENT)
Dept: OTHER | Facility: HOSPITAL | Age: 37
End: 2022-05-20

## 2022-05-24 ENCOUNTER — TELEPHONE (OUTPATIENT)
Dept: OBGYN | Facility: CLINIC | Age: 37
End: 2022-05-24
Payer: COMMERCIAL

## 2022-05-24 ENCOUNTER — LACTATION ENCOUNTER (OUTPATIENT)
Age: 37
End: 2022-05-24
Payer: COMMERCIAL

## 2022-05-24 NOTE — LACTATION NOTE
This note was copied from a baby's chart.  Lactation consultant to patient room to discuss breastfeeding with mom per her request. Mom states she is still only getting 1-2 ml when she pumps with hospital grade pump every 3 hours. Offered support and encouragement for all her hard work. Reviewed normal pumping/feeding amounts for full term infants and counseled mom that with her history of hormonal infertility she is at a point where her body may not make a full milk supply. Stated she could continue to pump and provide the 1-2 ml of milk until she wants to stop pumping. Gave suggestions for support during this time.

## 2022-05-25 ENCOUNTER — OFFICE VISIT (OUTPATIENT)
Dept: OBGYN | Facility: CLINIC | Age: 37
End: 2022-05-25
Payer: COMMERCIAL

## 2022-05-25 VITALS
BODY MASS INDEX: 35.48 KG/M2 | HEART RATE: 80 BPM | HEIGHT: 62 IN | SYSTOLIC BLOOD PRESSURE: 132 MMHG | DIASTOLIC BLOOD PRESSURE: 84 MMHG | WEIGHT: 192.8 LBS

## 2022-05-25 PROCEDURE — 99207 PR POST PARTUM EXAM: CPT | Mod: GE | Performed by: OBSTETRICS & GYNECOLOGY

## 2022-05-25 PROCEDURE — G0463 HOSPITAL OUTPT CLINIC VISIT: HCPCS

## 2022-05-25 RX ORDER — MORPHINE SULFATE 10 MG/5ML
SOLUTION ORAL EVERY 6 HOURS PRN
COMMUNITY
End: 2022-06-30

## 2022-05-25 ASSESSMENT — PATIENT HEALTH QUESTIONNAIRE - PHQ9
SUM OF ALL RESPONSES TO PHQ QUESTIONS 1-9: 1
5. POOR APPETITE OR OVEREATING: NOT AT ALL

## 2022-05-25 ASSESSMENT — ANXIETY QUESTIONNAIRES
1. FEELING NERVOUS, ANXIOUS, OR ON EDGE: NOT AT ALL
6. BECOMING EASILY ANNOYED OR IRRITABLE: NOT AT ALL
5. BEING SO RESTLESS THAT IT IS HARD TO SIT STILL: NOT AT ALL
7. FEELING AFRAID AS IF SOMETHING AWFUL MIGHT HAPPEN: SEVERAL DAYS
GAD7 TOTAL SCORE: 1
GAD7 TOTAL SCORE: 1
2. NOT BEING ABLE TO STOP OR CONTROL WORRYING: NOT AT ALL
3. WORRYING TOO MUCH ABOUT DIFFERENT THINGS: NOT AT ALL

## 2022-05-25 NOTE — PROGRESS NOTES
"[facility] Postpartum Visit Note    S: Jacinta is doing well today. She reports she is having some discomfort at the right side of the incision. Reports pain is 1/10, occasionally taking tylenol and ibuprofen. Reports she is still having some vaginal spotting, but bleeding has not gotten heavier. She is ambulating without difficulty. Denies N/V, fevers/chills, or concerns with her incision. Denies issues with urination or BMs. Is attempting to pump for babies in the NICU, but having difficulty establishing her milk supply. Has not seen lactation since she was admitted for delivery.    ROS: 10 point ROS neg other than the symptoms noted above in the HPI.     O:  EXAM:  /84   Pulse 80   Ht 1.575 m (5' 2\")   Wt 87.5 kg (192 lb 12.8 oz)   BMI 35.26 kg/m    General: alert, oriented, sitting comfortably  Psych: normal mood and affect  Abdomen: soft, non-tender, no masses appreciated.  Incision: Steri-strips in place which were removed, C/D/I, small area of pink skin at the center of the incision without warmth, drainage, or tenderness    A:   37 year old  POD#17 s/p PLTCS at 27w2d for PPROM followed by  labor with di/di twins. Surgery was complicated by a postpartum hemorrhage due to uterine atony. Here for postpartum visit. Doing well in the postpartum period. Has a small area of pink skin at the center of her incision that looks mildly irritated, patient reports it has been rubbing on clothes. No signs or symptoms of infection. Discussed warning signs for infection/return precautions, and recommended covering with a band-aid if the rubbing is becoming bothersome or worsening. Patient is also having trouble establishing milk supply, would like referral to lactation.    P:    Postpartum Care  - Contraception: IVF pregnancy  - Feeding: attempting to pump, referral to lactation placed  - Continue PNV  - Discussed return to clinic if any complications arise including heavy vaginal bleeding filling >1 pad/hour, " drainage from incision, uncontrolled pain, signs of infection, and new N/V    Staffed with Dr. Davis.    Alia Grossman MD  Ob/Gyn Resident, PGY-1    The Patient was seen in Resident Continuity Clinic by ALIA GROSSMAN.  I reviewed the history & exam. Assessment and plan were jointly made.    Catina Davis MD

## 2022-05-31 ENCOUNTER — ALLIED HEALTH/NURSE VISIT (OUTPATIENT)
Dept: MIDWIFE SERVICES | Facility: CLINIC | Age: 37
End: 2022-05-31
Payer: COMMERCIAL

## 2022-05-31 VITALS — DIASTOLIC BLOOD PRESSURE: 72 MMHG | SYSTOLIC BLOOD PRESSURE: 110 MMHG

## 2022-05-31 DIAGNOSIS — O92.79 INSUFFICIENT LACTATION: Primary | ICD-10-CM

## 2022-05-31 LAB
T4 FREE SERPL-MCNC: 0.81 NG/DL (ref 0.7–1.8)
TSH SERPL DL<=0.005 MIU/L-ACNC: 0.44 UIU/ML (ref 0.3–5)

## 2022-05-31 PROCEDURE — 84439 ASSAY OF FREE THYROXINE: CPT | Performed by: ADVANCED PRACTICE MIDWIFE

## 2022-05-31 PROCEDURE — 36415 COLL VENOUS BLD VENIPUNCTURE: CPT | Performed by: ADVANCED PRACTICE MIDWIFE

## 2022-05-31 PROCEDURE — 84443 ASSAY THYROID STIM HORMONE: CPT | Performed by: ADVANCED PRACTICE MIDWIFE

## 2022-05-31 PROCEDURE — 99203 OFFICE O/P NEW LOW 30 MIN: CPT | Performed by: ADVANCED PRACTICE MIDWIFE

## 2022-05-31 NOTE — PROGRESS NOTES
"Assessment:   1.  37 year old mother at 3 weeks postpartum with insufficient lactation:  Producing very small amounts of milk in spite of frequent pumping  2.  Numerous risk factors for low milk supply, including  birth, PCOS, infertility, no breast changes in pregnancy or postpartum, and significant PPH  3.  Hormonal imbalance most likely cause of low supply, given GYN history with normal breast exam today.        Plan:   1.  Reviewed potential causes of low milk supply, including hormonal imbalances, inadequate breast stimulation, insufficient glandular tissue, and Analilia's syndrome  2. Discussed that generally, more breast stimuation is most reliable way to increase milk supply. However, as Jacinta has been working on this without result, hormonal causes may be at root of current low supply.  3.  Reviewed option of thyroid and prolactin testing--explained thyroid is not difficult to asses and could contribute to low milk supply.  Jacinta would like to proceed with this today.  Explained prolactin is the hormone of milk-making, and does not  correlate exactly with milk supply, but can sometimes give some information about a possible reason for low milk supply, including Analilia's Syndrome, which can occur in cases of severe hemorrhage.  Advised there is no \"prolactin supplement\" to take if one's prolactin is low, however, and most of the things to do to increase it are the things people are already doing, such as frequent nursing or pumping.  Some people may choose to have this test and others may not.  Jacinta declines this today.  4.  Discussed medication options for increasing milk supply--explained that domperidone is not available in the [place], but that Reglan is one medication that can be used as a galactogogue and does improve milk supply for some women.  Jacinta would like to avoid any medications.  4.  Discussed other strategies for increasing milk supply, such as using warmth on breasts while pumping, " hands-on pumping, hand expression, and dietary supplements such as moringa, fenugreek or goat's rue.    5.  Discussed that gven breastfeeding challenges, although some families pursue all avenues and some wean to formula entirely, there are many other options for coping. These can include decreasing pumping efforts and using formula as needed, or setting a time boundary on efforts to increase supply.  Jacinta shares that she feels willing to return to regular pumping and possibly trying some supplements for another week or two.   6.  Follow up with lactation as needed.  [employer] can be used for brief questions, but it's important to know that messages are not seen Friday through Sunday. If urgent help is needed, Monday through Friday you can call [phone] and one of our lactation consultants will get the message and respond; if you need a rapid response over a weekend or holiday, it is best to call your on-call maternity or pediatric provider.  Please feel free to schedule a return visit if the concern is more detailed;  telephone visits are also an option if you don't feel you need to be seen in person.    Subjective: Jacinta is here today referred by Dr. Nayely Grossman of the [facility] because of concerns for low milk supply.  Has been pumping for her 27-week twins in the NICU until a few days ago, yielding about 5 ml at most.  She has not pumped in the last few days as she has found it exhausting and unproductive.  Jacinta denies noting any breast growth in her pregnancy or any breast engorgement in the first postpartum week.  Until a few days ago she had been pumping about every 3 hours most days. Tried more frequent pumping (every 2 hours) and hand expression, but did not feel these made any difference in her pump yield.  She has had the opportunity to have her twins skin to skin.  States pumping is comfortable--doesn't recall if she is experiencing any nipple friction inside of pump flanges.  She  "did not bring pump today--is using [facility]-grade pump.    Jacinta would like to provide milk for her babies if possible, but is feeling discouraged--thinks she may be willing to try pumping for another week or two.    Hospital Course: Had shortened cervix during pregnancy, then PPROM at 27w2d, requiring classical C/S at 27w3d.  Birth complicated by PPH of 1500 ml.  Seen by NICU IBCLCs for support with pumping for  twins.  Infants transferred to [facility] from [facility] on day 11 of life.    Mother's Relevant Med/Surg History: Long history of irregular menstrual cycles, \"my whole life.\" Diagnosed with PCOS and endometrial hyperplasia requiring medication.  Had 8 years of infertility:  5 failed attempts at IUI, conceived on second attempt at IVF.      Breast Surgery: none    Breastfeeding Goals: to be able to provide some breastmilk for babies    Previous Breastfeeding Experience: first babies    Infants' names: Dionte and Oly  Infant's bday: 22  Gestational age: 27w3d  Infant's birth weight: Dionte:  2 # 5.7 oz;  Oly:  2 # 8.9 oz    Mode of delivery:   Pediatric Provider: babies remain in NICU    Frequency and duration of feedings: every 2 hours, taking about 17 ml of donor milk per feeding  Swallows audible per mother: not at breast    Pumping: had been pumping about 6-8 times/day, yielding from 0 to 5 ml.  Stopped pumping a few days ago due to low supply.      Objective/Physical exam:   Mother: Did not notice breasts grew larger although areolas darkened during pregnancy;  and she did not notice primary engorgement when her milk came in.   Her nipples are everted, the areola is compressible, the breast is soft and normally shaped with good veining.  Easily able to express drops of milk by hand in office today.    Sore nipples: none  EPDS: not completed today    Assessment of infants: not done;  Infants in NICU due to prematurity    There were no vitals taken for this visit.  OB " History    Para Term  AB Living   1 1 0 1 0 2   SAB IAB Ectopic Multiple Live Births   0 0 0 1 2      # Outcome Date GA Lbr Arnaldo/2nd Weight Sex Delivery Anes PTL Lv   1A  22 27w3d   M CS-Classical Spinal N GRACIELA      Name: ANASTASIIA,MALE-A LTIO      Apgar1: 5  Apgar5: 7   1B  22 27w3d   F CS-Classical  N GRACIELA      Name: ANASTASIIA,FEMALE-B LITO      Apgar1: 7  Apgar5: 8       Current Outpatient Medications:      acetaminophen (TYLENOL) 325 MG tablet, Take 2 tablets (650 mg) by mouth every 6 hours as needed for mild pain Start after Delivery., Disp: 100 tablet, Rfl: 0     ferrous sulfate (FEROSUL) 325 (65 Fe) MG tablet, Take 1 tablet (325 mg) by mouth daily (with breakfast), Disp: 90 tablet, Rfl: 1     ibuprofen (ADVIL/MOTRIN) 600 MG tablet, Take 1 tablet (600 mg) by mouth every 6 hours as needed for moderate pain Start after delivery, Disp: 60 tablet, Rfl: 0     morphine 10 MG/5ML solution, Take by mouth every 6 hours as needed for severe pain, Disp: , Rfl:      senna-docusate (SENOKOT-S/PERICOLACE) 8.6-50 MG tablet, Take 1 tablet by mouth daily Start after delivery. (Patient taking differently: Take 1 tablet by mouth daily as needed Start after delivery.), Disp: 100 tablet, Rfl: 0  No past medical history on file.  Past Surgical History:   Procedure Laterality Date      SECTION N/A 2022    Procedure:  SECTION;  Surgeon: Rach Trujillo MD;  [facility]     No family history on file.    Time spent:  Chart review/precharting: 10 min prior to day of service  Face-to-face visit:   27 min   Documentation:  15 min  Total time spent on day of service: 42 min    LEVY Velázquez, CNM, IBCLC

## 2022-05-31 NOTE — PATIENT INSTRUCTIONS
"  1.  There are a number of possible causes of low milk supply, including hormonal imbalances, not enough breast stimulation, not enough milk-making tissue in the breast (called insufficient glandular tissue or mammary hypoplasia), or Analilia's syndrome, which is a rare complication of postpartum hemorrhage that can cause damage to the part of the brain that makes prolactin, the breastfeeding hormone.  2. Generally, more breast stimuation is most reliable way to increase milk supply. However, as you have been working on this without much success, hormonal causes may be at root of your current low supply.    3.  Hormone testing that can be done includes checking your thyroid--this is not difficult to asses sand could contribute to low milk supply.  We checked this today.  The other hormone we can check is prolactin-- it is the hormone of milk-making, and although it does not  correlate exactly with milk supply, it can sometimes give some information about a possible reason for low milk supply.  One of these is Analilia's Syndrome, in which the prolactin level is very low.  There is no \"prolactin supplement\" to take if one's prolactin is low, however, and most of the things to do to increase it are the things people are already doing, such as frequent nursing or pumping.  Some people may choose to have this test and others may not--you decided against checking this today.   4.  There is one medication option for increasing milk supply, a drug called Reglan.  It does improve milk supply for some women, but not all, and can cause concerns with depression or muscular problems if used long-term.  We generally just use it for two weeks.  One other medication that can be used for milk supply problems, domperidone, is not available in the .  You decided you would prefer to avoid any medications.  5  Other strategies for increasing milk supply, such as using warmth on breasts while pumping, gently massaging your breasts " "while pumping, hand expression, and dietary supplements such as moringa, fenugreek or goat's rue are other things you can try.   5.  Know that given breastfeeding challenges, although some families pursue all avenues and some wean to formula entirely, there are other options for coping. These can include decreasing pumping efforts and using formula as needed, or setting a time boundary on efforts to increase supply.  We planned for you to return to regular pumping and possibly try some dietary supplements for another week or two before re-evaluating.  6.  Follow up with lactation as needed.  [facility] can be used for brief questions, but it's important to know that messages are not seen Friday through Sunday. If urgent help is needed, Monday through Friday you can call [phone] and one of our lactation consultants will get the message and respond; if you need a rapid response over a weekend or holiday, it is best to call your on-call maternity or pediatric provider.  Please feel free to schedule a return visit if the concern is more detailed;  telephone visits are also an option if you don't feel you need to be seen in person.      -------------------------------------------------------------------------------------------------  Information for breastfeeding families on Increasing breastmilk supply when pumping    Frequent stimulation of the breasts, by hand expression or by using a breast pump during the first few days and weeks, is essential to establish an abundant breastmilk supply.     More breast stimulation  Pump more often, at least 8-12 times per 24 hours.    Remember that more shorter, more frequent pumping sessions are more effective than fewer, longer sessions  Try to get in \"one more pumping\" before you go to sleep  Empty your breasts well by massaging while pumping:  \"hands-on pumping\" has been research-proven to result in more milk    Avoid these things that are known to reduce breastmilk " "supply  Smoking  Caffeine  Birth control pills and injections  Decongestants, antihistamines  Severe weight loss diets  Mints, parsley, cory in excessive amounts    Pumping Ideas  Consider use of a hospital grade breast pump with a double kit (you already have this)  Rest 10-15 minutes prior to pumping, and eat and drink something  Apply warmth to your breasts and massage before beginning to pump  Do hand expression after pumping. This can help bring out more milk, as well as offer extra breast stimulation.   Try \"power pumping\". Pumping 12 x a day for 2-3 days after a feeding, even for a short time. Or try pumping for 10min, resting for 10 min, pumping 10 min etc for an hour a few times a day.     Condition your let-down reflex when pumping  Play relaxing music  Imagine your baby, look at pictures of your baby, smell baby clothing   Watch videos of your baby  Always pump in the same quiet, relaxed place, set up a routine  Do slow, deep, relaxed breathing, relax your shoulders  Consider covering your pump bottles with a blanket, scarf or nursing cover so you don't continuously check the amount that is flowing    Mother care  Reduce stress and activity, get help  Increase fluid intake  Eat nutritious meals, continue to take prenatal vitamins  Back rubs:  they stimulate nerves that serve the breasts (central part of the spine)  Increase skin-to-skin holding time with your baby, relax together  Take a warm, bath, read,meditate, and empty your mind of tasks that need to be done    Herbs, food and supplements   These may offer help to some women, but frequent milk removal helps much more!  Supplements are not a substitute.  Avery's yeast: 3 Tablespoons daily, increase by 1/2 teaspoon daily until results are seen  Moringa (also called malunggay):  this is a leaf that is commonly eaten in [place], and has been shown in a number of studies to increase milk supply.  In this country it is found in powder form, often " sold in capsules.  It is sold under a number of brand names. A common dose is 250-350 mg three times daily.  Fenugreek:  Doses of 3-5 capsules (580-610 mg each) three times per day are commonly recommended. Avoid fenugreek if you are diabetic, hypoglycemic, asthmatic or allergic to peanuts or other legumes or beans. Taken as directed, it may cause a faint maple body odor. That is to be expected and means that the herb is doing it's job.   Torbangun:  a leaf used in [place] for helping with milk supply.  A few studies have found this to be helpful.  Several companies sell this in compounds for increasing mother's milk.  Shatavari:  a type of asparagus found in [place], also found to help with milk supply.  Available in several compounds made by different companies.  Oatmeal:  try a bowl daily.  Barley and quinoa have also been found to be helpful.  Calcium supplement:  this seems to be particularly helpful for those women who note a decrease in milk supply around their menstrual cycles.  Take 1500 mg of calcium with 750 mg of magnesium daily from midcycle through your period.  Blessed thistle, goat's rue, or other herbs or beverages such as Mother's Milk Tea taken as directed on the package. Reliable sources of herbs and herbal blends are Motherlove Herbals, Tawnya Herbs and Legendairy Milk.  Lactation cookies:  these are very expensive (often around $20 for one package of mix) and many do not contain anything more special than oatmeal, flaxseed and lee's yeast, along with sugar and chocolate.    Medications  Prescription medication sometimes can help increase milk supply. Metaclopromide (Reglan) has been used with some success. It should not be used for more than 2 weeks due to side effects with longterm use, and can cause depression in some people, but may be an option for some women.    Domperidone has been used with more success, but is not FDA approved in the [place], and is no longer available in   pharmacies.    Keep records  It is important to keep a daily log with the number of pumping sessions, amount obtained, and 24 hour totals--this amount is more important that the pumped amount at each session. This will help you see your progress over the days.   Keep in touch with your health care provider so he/she can monitor your progress over the days and modify advice if necessary.     Potential Causes to investigate:    Retained placenta  If you are not seeing improvement and you are having any heavy bleeding, discuss the possibility of retained placental fragments with your doctor or midwife. Small bits of the placenta can secrete enough hormones to prevent the milk from coming in.    Low thyroid  Have you health care provider check your thyroid levels. Low thyroid can affect milk supply. If you have been taking thyroid medication, have your levels checked after delivery; you may need your medication adjusted.     Other resources: [email or web address]    [place] Hand Expression Video :[email or web address]     Maximizing Milk Production Video:  [email or web address]

## 2022-06-30 ENCOUNTER — VIRTUAL VISIT (OUTPATIENT)
Dept: SURGERY | Facility: CLINIC | Age: 37
End: 2022-06-30
Payer: COMMERCIAL

## 2022-06-30 VITALS — BODY MASS INDEX: 34.87 KG/M2 | WEIGHT: 189.5 LBS | HEIGHT: 62 IN

## 2022-06-30 DIAGNOSIS — R73.03 PRE-DIABETES: ICD-10-CM

## 2022-06-30 DIAGNOSIS — E28.2 POLYCYSTIC OVARY SYNDROME: ICD-10-CM

## 2022-06-30 DIAGNOSIS — E66.9 OBESITY (BMI 30-39.9): Primary | ICD-10-CM

## 2022-06-30 PROCEDURE — 99214 OFFICE O/P EST MOD 30 MIN: CPT | Mod: GT | Performed by: FAMILY MEDICINE

## 2022-06-30 RX ORDER — PHENTERMINE HYDROCHLORIDE 37.5 MG/1
18.75-37.5 TABLET ORAL
Qty: 90 TABLET | Refills: 0 | Status: SHIPPED | OUTPATIENT
Start: 2022-06-30 | End: 2022-09-28

## 2022-06-30 RX ORDER — CHOLECALCIFEROL (VITAMIN D3) 50 MCG
1 TABLET ORAL DAILY
COMMUNITY

## 2022-06-30 RX ORDER — METFORMIN HCL 500 MG
500 TABLET, EXTENDED RELEASE 24 HR ORAL 2 TIMES DAILY WITH MEALS
Qty: 180 TABLET | Refills: 3 | Status: SHIPPED | OUTPATIENT
Start: 2022-06-30 | End: 2023-06-30

## 2022-06-30 RX ORDER — MEDROXYPROGESTERONE ACETATE 10 MG
TABLET ORAL
COMMUNITY
Start: 2022-06-26

## 2022-06-30 NOTE — PATIENT INSTRUCTIONS
[facility]    Remember to:    -Eat 3 meals a day (not 2, not 5) Chew your food well/SLOW down  -Eat your protein first  -Be a water drinker/Minize liquid calories (no regular pop, no juice) skim or 1% milk OK  -Sleep 7-8 hours each night. Address sleep if problematic  -Stress management is important. Address if problematic  -Move-8000 steps daily Muscle: maintain your muscle mass (strength training 2X/wk)  -Wheat, not white (bread, pasta, crackers, jennifer, bagels, tortillas, rice)  -Limit restaurant, cafeteria, take out, drive through to 2 times per week or less  -Minimize caffeine, alcohol, and night-time snacking  -Consider keeping a food diary (i.e. My Fitness Pal, Lose It, or other food tracker)  -Follow up with the dietitian      **Some lean proteins: chicken, turkey, tuna, salmon, crab, fish, shrimp, scallops, lobster, lean cuts of beef and pork, luncheon meats, veggie burgers, beans (black, lima, garbanzo, espinoza, kidney, refried), chile, cottage cheese, string cheese, other cheese, eggs, tofu, peanut butter, nuts, vegan crumbles, greek yogurt

## 2022-06-30 NOTE — LETTER
2022         RE: Jacinta Aguayo  [address]  [facility] [address]        Dear Colleague,    Thank you for referring your patient, Jacinta Aguayo, to the [facility] [place]. Please see a copy of my visit note below.    Jacinta Aguayo is 37 year old  female who presents for a billable video visit today.    How would you like to obtain your AVS? MyChart  If dropped from the video visit, the video invitation should be resent by: Text to cell phone: [phone]  Will anyone else be joining your video visit? No      Video Start Time: 2:11 PM    Are there any specific questions or needs that you would like addressed at your visit today? Gaining weight since pregnancy    Provider Notes:   Bariatric Follow-up    37 year old  female BMI:Body mass index is 34.66 kg/m .    Weight:   Wt Readings from Last 1 Encounters:   22 86 kg (189 lb 8 oz)    pounds    Comorbidities:  Patient Active Problem List   Diagnosis     Infertility associated with anovulation     Complex endometrial hyperplasia with atypia     Cervical cancer screening     Polycystic ovary syndrome     Obesity (BMI 30-39.9)     Menstrual disorder     Acanthosis nigricans     Foot pain     Insulin resistance     Pre-diabetes     Dyslipidemia     Metabolic syndrome X      contractions     Encounter for triage in pregnant patient      premature rupture of membranes (PPROM) with unknown onset of labor         Interim: Had twins May 9th boy and girl. They are in the NICU for another month. They are about 5# now. She currently has COVID. Her Hgb was 8.4 after delivery. She did the traditional diet after her delivery and lost 20#. She was 205# and is down to 189#. She would like to restart the metformin and phentermine. She had an IUD previously and plans to have one placed again.     Plan:  DIET  Will work toward protein in the am, lean proteins,    EXERCISE walks as able   PHARMACOTHERAPY restart  PNV, metformin and phentermine    One change at a time. Eliminating calorie containing beverages in the setting of insulin resistance would be a good first goal.       -We reviewed her medications and whether associated with weight gain.    Current Outpatient Medications:      metFORMIN (GLUCOPHAGE XR) 500 MG 24 hr tablet, Take 1 tablet (500 mg) by mouth 2 times daily (with meals), Disp: 180 tablet, Rfl: 3     phentermine (ADIPEX-P) 37.5 MG tablet, Take 0.5-1 tablets (18.75-37.5 mg) by mouth every morning (before breakfast) for 90 days, Disp: 90 tablet, Rfl: 0     vitamin D3 (CHOLECALCIFEROL) 50 mcg (2000 units) tablet, Take 1 tablet by mouth daily, Disp: , Rfl:      medroxyPROGESTERone (PROVERA) 10 MG tablet, , Disp: , Rfl:       We discussed [facility] Basics including:  -eating 3 meals daily  -eating protein first  -eating slowly, chewing food well  -avoiding/limiting calorie containing beverages  -choosing wheat, not white with breads, crackers, pastas, jennifer, bagels, tortillas, rice  -limiting restaurant or cafeteria eating to twice a week or less  -We discussed the importance of restorative sleep and stress management in maintaining a healthy weight.  -We discussed insulin resistance and glycemic index as it relates to appetite and weight control  -We discussed the National Weight Control Registry healthy weight maintenance strategies and ways to optimize metabolism.  -We discussed the importance of physical activity including cardiovascular and strength training in maintaining a healthier weight and explored viable options.    Most recent labs:  Lab Results   Component Value Date    WBC 16.2 (H) 05/10/2022    HGB 8.4 (L) 05/10/2022    HCT 27.2 (L) 05/10/2022    MCV 84 05/10/2022     05/10/2022     Lab Results   Component Value Date    CHOL 248 (H) 09/06/2019     Lab Results   Component Value Date    HDL 64 09/06/2019     No components found for: LDLCALC  Lab Results   Component Value  "Date    TRIG 130 09/06/2019       Lab Results   Component Value Date    TSH 0.44 05/31/2022         DIETARY HISTORY  Meals Per Day: yes  Eating Protein First?: not really  Food Diary: B:cereal (cinnamon crunch) L:rice and meat and veggies D:meat veggies and rice  Snacks Per Day: yes-\"I snack a lot\" \"I think that's my problem\"  Typical Snack: sweets  Fluid Intake: no soda. Occ juice  Portion Control: problematic  Calorie Containing Beverages: juice  Alcohol per week: occ with her -sips on a few 3 cocktails on a Saturday  Typical Protein Food Choices: variety  Choosing Whole Grains: cottage bread, wheat, long grain rice  Grocery Shopping is done by: **  Food Preparation is done by: herself  Meals at Restaurant/Cafeteria/Take Out Per Week: \"we go out to eat a lot.\" 2 times a day on the weekends  Eating at the Table:   TV is Off During Meals:     Positive Changes Since Last Visit: ready to get back on track  Struggling With: eating fatty foods,     Knowledgeable in Reading Food Labels: yes  Getting Adequate Protein: yes  Sleeping 7-8 hours/day yes  Stress management feels it has improved now that she moved    PHYSICAL ACTIVITY PATTERNS:  Cardiovascular: walking the halls in the hospitals. Starting to bike.   Strength Training: no    REVIEW OF SYSTEMS  GENERAL/CONSTITUTIONAL:  Fatigue: yes  CARDIOVASCULAR:  Chest Pain with Exertion: no  PULMONARY:  Dyspnea on exertion: yes  CPAP Use:   Tobacco Use: no  GASTROINTESTINAL:  GERD/Heartburn: no  UROLOGIC:  Urinary Symptoms: no  NEUROLOGIC:  Headaches: no  Paresthesias: no  PSYCHIATRIC:  Moods: stable  MUSCULOSKELETAL/RHEUMATOLOGIC  Arthralgias: no  Myalgias: no  ENDOCRINE:  Monitoring Blood Sugars: no  Sugars Well Controlled: yes  DERMATOLOGIC:  Rashes: no    PHYSICAL EXAM: (most recent vitals and today's stated weight)  Vitals: Ht 1.575 m (5' 2\")   Wt 86 kg (189 lb 8 oz)   BMI 34.66 kg/m        GEN: Pleasant and in no acute distress  PSYCH: A&OX3,     I have " reviewed the note as documented above.  This accurately captures the substance of my conversation with the patient.  Thank you for the opportunity to participate in the care of your patient.    Ophelia Vo MD, FAAFP  [facility]  Diplomate, American Board of Obesity Medicine    Total time spent on the date of this encounter doing: chart review, review of test results, patient visit, physical exam, education, counseling, developing plan of care, and documenting = 30 minutes.          Video-Visit Details    Type of service:  Video Visit    Video End Time (time video stopped): 2:41  Originating Location (pt. Location): Home    Distant Location (provider location):  [facility] CL[facility]     Platform used for Video Visit: Vero      Again, thank you for allowing me to participate in the care of your patient.        Sincerely,        Ophelia Vo MD

## 2022-06-30 NOTE — PROGRESS NOTES
Jacinta Aguayo is 37 year old  female who presents for a billable video visit today.    How would you like to obtain your AVS? MyChart  If dropped from the video visit, the video invitation should be resent by: Text to cell phone: [phone]  Will anyone else be joining your video visit? No      Video Start Time: 2:11 PM    Are there any specific questions or needs that you would like addressed at your visit today? Gaining weight since pregnancy    Provider Notes:   Bariatric Follow-up    37 year old  female BMI:Body mass index is 34.66 kg/m .    Weight:   Wt Readings from Last 1 Encounters:   22 86 kg (189 lb 8 oz)    pounds    Comorbidities:  Patient Active Problem List   Diagnosis     Infertility associated with anovulation     Complex endometrial hyperplasia with atypia     Cervical cancer screening     Polycystic ovary syndrome     Obesity (BMI 30-39.9)     Menstrual disorder     Acanthosis nigricans     Foot pain     Insulin resistance     Pre-diabetes     Dyslipidemia     Metabolic syndrome X      contractions     Encounter for triage in pregnant patient      premature rupture of membranes (PPROM) with unknown onset of labor         Interim: Had twins May 9th boy and girl. They are in the NICU for another month. They are about 5# now. She currently has COVID. Her Hgb was 8.4 after delivery. She did the traditional diet after her delivery and lost 20#. She was 205# and is down to 189#. She would like to restart the metformin and phentermine. She had an IUD previously and plans to have one placed again.     Plan:  DIET  Will work toward protein in the am, lean proteins,    EXERCISE walks as able   PHARMACOTHERAPY restart PNV, metformin and phentermine    One change at a time. Eliminating calorie containing beverages in the setting of insulin resistance would be a good first goal.       -We reviewed her medications and whether associated with weight gain.    Current Outpatient  Medications:      metFORMIN (GLUCOPHAGE XR) 500 MG 24 hr tablet, Take 1 tablet (500 mg) by mouth 2 times daily (with meals), Disp: 180 tablet, Rfl: 3     phentermine (ADIPEX-P) 37.5 MG tablet, Take 0.5-1 tablets (18.75-37.5 mg) by mouth every morning (before breakfast) for 90 days, Disp: 90 tablet, Rfl: 0     vitamin D3 (CHOLECALCIFEROL) 50 mcg (2000 units) tablet, Take 1 tablet by mouth daily, Disp: , Rfl:      medroxyPROGESTERone (PROVERA) 10 MG tablet, , Disp: , Rfl:       We discussed [facility] Basics including:  -eating 3 meals daily  -eating protein first  -eating slowly, chewing food well  -avoiding/limiting calorie containing beverages  -choosing wheat, not white with breads, crackers, pastas, jennifer, bagels, tortillas, rice  -limiting restaurant or cafeteria eating to twice a week or less  -We discussed the importance of restorative sleep and stress management in maintaining a healthy weight.  -We discussed insulin resistance and glycemic index as it relates to appetite and weight control  -We discussed the National Weight Control Registry healthy weight maintenance strategies and ways to optimize metabolism.  -We discussed the importance of physical activity including cardiovascular and strength training in maintaining a healthier weight and explored viable options.    Most recent labs:  Lab Results   Component Value Date    WBC 16.2 (H) 05/10/2022    HGB 8.4 (L) 05/10/2022    HCT 27.2 (L) 05/10/2022    MCV 84 05/10/2022     05/10/2022     Lab Results   Component Value Date    CHOL 248 (H) 09/06/2019     Lab Results   Component Value Date    HDL 64 09/06/2019     No components found for: LDLCALC  Lab Results   Component Value Date    TRIG 130 09/06/2019       Lab Results   Component Value Date    TSH 0.44 05/31/2022         DIETARY HISTORY  Meals Per Day: yes  Eating Protein First?: not really  Food Diary: B:cereal (cinnamon crunch) L:rice and meat and veggies D:meat veggies and  "rice  Snacks Per Day: yes-\"I snack a lot\" \"I think that's my problem\"  Typical Snack: sweets  Fluid Intake: no soda. Occ juice  Portion Control: problematic  Calorie Containing Beverages: juice  Alcohol per week: occ with her -sips on a few 3 cocktails on a Saturday  Typical Protein Food Choices: variety  Choosing Whole Grains: cottage bread, wheat, long grain rice  Grocery Shopping is done by: **  Food Preparation is done by: herself  Meals at Restaurant/Cafeteria/Take Out Per Week: \"we go out to eat a lot.\" 2 times a day on the weekends  Eating at the Table:   TV is Off During Meals:     Positive Changes Since Last Visit: ready to get back on track  Struggling With: eating fatty foods,     Knowledgeable in Reading Food Labels: yes  Getting Adequate Protein: yes  Sleeping 7-8 hours/day yes  Stress management feels it has improved now that she moved    PHYSICAL ACTIVITY PATTERNS:  Cardiovascular: walking the halls in the hospitals. Starting to bike.   Strength Training: no    REVIEW OF SYSTEMS  GENERAL/CONSTITUTIONAL:  Fatigue: yes  CARDIOVASCULAR:  Chest Pain with Exertion: no  PULMONARY:  Dyspnea on exertion: yes  CPAP Use:   Tobacco Use: no  GASTROINTESTINAL:  GERD/Heartburn: no  UROLOGIC:  Urinary Symptoms: no  NEUROLOGIC:  Headaches: no  Paresthesias: no  PSYCHIATRIC:  Moods: stable  MUSCULOSKELETAL/RHEUMATOLOGIC  Arthralgias: no  Myalgias: no  ENDOCRINE:  Monitoring Blood Sugars: no  Sugars Well Controlled: yes  DERMATOLOGIC:  Rashes: no    PHYSICAL EXAM: (most recent vitals and today's stated weight)  Vitals: Ht 1.575 m (5' 2\")   Wt 86 kg (189 lb 8 oz)   BMI 34.66 kg/m        GEN: Pleasant and in no acute distress  PSYCH: A&OX3,     I have reviewed the note as documented above.  This accurately captures the substance of my conversation with the patient.  Thank you for the opportunity to participate in the care of your patient.    Ophelia Vo MD, FAAFP  [facility]  Diplomate, American Board of Obesity Medicine    Total time spent on the date of this encounter doing: chart review, review of test results, patient visit, physical exam, education, counseling, developing plan of care, and documenting = 30 minutes.          Video-Visit Details    Type of service:  Video Visit    Video End Time (time video stopped): 2:41  Originating Location (pt. Location): Home    Distant Location (provider location):  [facility] CL[facility] BAR[facility]ATR[facility]     Platform used for Video Visit: Ovuline

## 2022-07-01 ENCOUNTER — VIRTUAL VISIT (OUTPATIENT)
Dept: SURGERY | Facility: CLINIC | Age: 37
End: 2022-07-01
Payer: COMMERCIAL

## 2022-07-01 DIAGNOSIS — E66.811 OBESITY, CLASS I, BMI 30.0-34.9 (SEE ACTUAL BMI): Primary | ICD-10-CM

## 2022-07-01 DIAGNOSIS — E28.2 POLYCYSTIC OVARY SYNDROME: ICD-10-CM

## 2022-07-01 PROCEDURE — 97802 MEDICAL NUTRITION INDIV IN: CPT | Mod: GT | Performed by: DIETITIAN, REGISTERED

## 2022-07-01 NOTE — LETTER
2022         RE: Jacinta Aguayo  [address]  [facility] [address]        Dear Colleague,    Thank you for referring your patient, Jacinta Aguayo, to the [facility] AND [facility]IATRICS CARE [place]. Please see a copy of my visit note below.    Jacinta Aguayo is a 37 year old who is being evaluated via a billable video visit.      How would you like to obtain your AVS? MyChart  If the video visit is dropped, the invitation should be resent by: Send to e-mail at: [email or web address]  Will anyone else be joining your video visit? No      Video Start Time: 1:00p      Medical Weight Loss Initial Diet Evaluation  Assessment:  Jacinta is presenting today for a new weight management nutrition consultation. Pt has had an initial appointment with Dr. Vo.  Weight loss medication: Phentermine and metformin    Personal Goals: post partum, would like get back on track, goal weight is 140lb     Anthropometrics:    Pt's weight is 189.5 lb    BMI: 34.66  Ideal body weight: 50.1 kg (110 lb 7.2 oz)  Adjusted ideal body weight: 64.4 kg (142 lb 1.1 oz)  Estimated RMR ([place]-St Jeor equation):  1495 kcals x 1.2 (sedentary) = 1794 kcals (for weight maintenance)    Recommended Protein Intake: 66-88 grams of protein/day    Medical History:  Patient Active Problem List   Diagnosis     Infertility associated with anovulation     Complex endometrial hyperplasia with atypia     Cervical cancer screening     Polycystic ovary syndrome     Obesity (BMI 30-39.9)     Menstrual disorder     Acanthosis nigricans     Foot pain     Insulin resistance     Pre-diabetes     Dyslipidemia     Metabolic syndrome X      contractions     Encounter for triage in pregnant patient      premature rupture of membranes (PPROM) with unknown onset of labor      Nutrition History:   +weekends are busy- can sometimes go a whole day without eating  +struggles with sweets- donuts, oreos    Dietary Recall:  Breakfast: none- coffee  this morning-sometimes will have cereal  Lunch: 12p- rice and meat (wings)  Might have a snack 4-5p- noodles, sweets  Dinner: 8p- eggs and sausage  Typical Snacks: sweets, fruits    Beverages: coffee in the morning, water and juice    Nutrition Diagnosis (PES statement):   Overweight/Obesity (NC 3.3) related to overeating and poor lifestyle habits as evidenced by patient report of large portions, intake of simple sugars and BMI 34.66      Nutrition Intervention  1. Food and/or Nutrient Delivery   a. Placed emphasis on importance of developing a healthy meal routine, aiming for 3 meals a day and no snacks.  b. Discussed using a protein supplement as a meal replacement.  2. Nutrition Education   a. Discussed with patient how to build a meal: the importance of including a lean/low fat protein at each meal, include a source of vegetables at a minimum of lunch and dinner and limiting carbohydrate intake to 1 cup per meal.  b. Educated on sources of lean protein, portion sizes, the amount of grams found in each source. Recommend patient to aim for 20-30g protein at each meal.  c. Educated on how to read a food label: keeping total fat <10g and sugar <10g per serving.  d. Discussed the importance of adequate hydration, with emphasis on drinking 64oz of water or zero calorie beverages per day.  3. Nutrition Counseling   a. Discussed mindful eating techniques such as eating off smaller places/bowls      Goals established by patient:   1. Eliminate calorie containing beverages  2. Veggies at lunch and dinner    Handouts provided:  Keys to success for weight management  Protein supplement list  Label reading    Assessment/Plan:    Pt will follow up in 2 month(s) with bariatrician and 1 month(s) with dietitian.         Video-Visit Details    Type of service:  Video Visit    Video End Time:1:27 PM    Originating Location (pt. Location): Home    Distant Location (provider location):  [facility] SURGERY [facility] AND  BARIATRICS CARE [place]     Platform used for Video Visit: Vero HASKINS RD        Again, thank you for allowing me to participate in the care of your patient.        Sincerely,        TARAH HASKINS RD

## 2022-07-01 NOTE — PROGRESS NOTES
Jacinta Aguayo is a 37 year old who is being evaluated via a billable video visit.      How would you like to obtain your AVS? MyChart  If the video visit is dropped, the invitation should be resent by: Send to e-mail at: [email or web address]  Will anyone else be joining your video visit? No      Video Start Time: 1:00p      Medical Weight Loss Initial Diet Evaluation  Assessment:  Jacinta is presenting today for a new weight management nutrition consultation. Pt has had an initial appointment with Dr. Vo.  Weight loss medication: Phentermine and metformin    Personal Goals: post partum, would like get back on track, goal weight is 140lb     Anthropometrics:    Pt's weight is 189.5 lb    BMI: 34.66  Ideal body weight: 50.1 kg (110 lb 7.2 oz)  Adjusted ideal body weight: 64.4 kg (142 lb 1.1 oz)  Estimated RMR ([place]-St Jeor equation):  1495 kcals x 1.2 (sedentary) = 1794 kcals (for weight maintenance)    Recommended Protein Intake: 66-88 grams of protein/day    Medical History:  Patient Active Problem List   Diagnosis     Infertility associated with anovulation     Complex endometrial hyperplasia with atypia     Cervical cancer screening     Polycystic ovary syndrome     Obesity (BMI 30-39.9)     Menstrual disorder     Acanthosis nigricans     Foot pain     Insulin resistance     Pre-diabetes     Dyslipidemia     Metabolic syndrome X      contractions     Encounter for triage in pregnant patient      premature rupture of membranes (PPROM) with unknown onset of labor      Nutrition History:   +weekends are busy- can sometimes go a whole day without eating  +struggles with sweets- donuts oreos    Dietary Recall:  Breakfast: none- coffee this morning-sometimes will have cereal  Lunch: 12p- rice and meat (wings)  Might have a snack 4-5p- noodles, sweets  Dinner: 8p- eggs and sausage  Typical Snacks: sweets, fruits    Beverages: coffee in the morning, water and juice    Nutrition Diagnosis (PES statement):    Overweight/Obesity (NC 3.3) related to overeating and poor lifestyle habits as evidenced by patient report of large portions, intake of simple sugars and BMI 34.66      Nutrition Intervention  1. Food and/or Nutrient Delivery   a. Placed emphasis on importance of developing a healthy meal routine, aiming for 3 meals a day and no snacks.  b. Discussed using a protein supplement as a meal replacement.  2. Nutrition Education   a. Discussed with patient how to build a meal: the importance of including a lean/low fat protein at each meal, include a source of vegetables at a minimum of lunch and dinner and limiting carbohydrate intake to 1 cup per meal.  b. Educated on sources of lean protein, portion sizes, the amount of grams found in each source. Recommend patient to aim for 20-30g protein at each meal.  c. Educated on how to read a food label: keeping total fat <10g and sugar <10g per serving.  d. Discussed the importance of adequate hydration, with emphasis on drinking 64oz of water or zero calorie beverages per day.  3. Nutrition Counseling   a. Discussed mindful eating techniques such as eating off smaller places/bowls      Goals established by patient:   1. Eliminate calorie containing beverages  2. Veggies at lunch and dinner    Handouts provided:  Keys to success for weight management  Protein supplement list  Label reading    Assessment/Plan:    Pt will follow up in 2 month(s) with bariatrician and 1 month(s) with dietitian.         Video-Visit Details    Type of service:  Video Visit    Video End Time:1:27 PM    Originating Location (pt. Location): Home    Distant Location (provider location):  [facility] SURGERY [facility] AND BARIATRICS CARE [place]     Platform used for Video Visit: Vero HASKINS RD

## 2022-07-24 ENCOUNTER — HEALTH MAINTENANCE LETTER (OUTPATIENT)
Age: 37
End: 2022-07-24

## 2022-08-18 ENCOUNTER — VIRTUAL VISIT (OUTPATIENT)
Dept: SURGERY | Facility: CLINIC | Age: 37
End: 2022-08-18
Payer: COMMERCIAL

## 2022-08-18 DIAGNOSIS — E66.811 OBESITY, CLASS I, BMI 30.0-34.9 (SEE ACTUAL BMI): Primary | ICD-10-CM

## 2022-08-18 DIAGNOSIS — E28.2 POLYCYSTIC OVARY SYNDROME: ICD-10-CM

## 2022-08-18 PROCEDURE — 97803 MED NUTRITION INDIV SUBSEQ: CPT | Mod: TEL | Performed by: DIETITIAN, REGISTERED

## 2022-08-18 NOTE — LETTER
2022         RE: Jacinta Aguayo  [address]  [place] [address]        Dear Colleague,    Thank you for referring your patient, Jacinta Aguayo, to the [facility] AND BARIATRICS CARE [place]. Please see a copy of my visit note below.    Jacinta Aguayo is a 37 year old who is being evaluated via a billable telephone visit.      What phone number would you like to be contacted at? [phone]  How would you like to obtain your AVS? [facility]      Medical  Weight Loss Follow-Up Diet Evaluation  Assessment:  Jacinta is presenting today for a follow up weight management nutrition consultation. Pt has had an initial appointment with Dr. Vo  Weight loss medication: Phentermine and metformin- forgets to take evening dose  Initial weight: 189.5lb  Weight change: no new weight per patient    BMI: 34.66  Ideal body weight: 50.1 kg (110 lb 7.2 oz)  Adjusted ideal body weight: 64.4 kg (142 lb 1.1 oz)    Estimated RMR ([place]-St Jeor equation):   1495 kcals x 1.2 (sedentary) = 1794 kcals (for weight maintenance)  Recommended Protein Intake: 66-88 grams of protein/day  Patient Active Problem List:  Patient Active Problem List   Diagnosis     Infertility associated with anovulation     Complex endometrial hyperplasia with atypia     Cervical cancer screening     Polycystic ovary syndrome     Obesity (BMI 30-39.9)     Menstrual disorder     Acanthosis nigricans     Foot pain     Insulin resistance     Pre-diabetes     Dyslipidemia     Metabolic syndrome X      contractions     Encounter for triage in pregnant patient      premature rupture of membranes (PPROM) with unknown onset of labor     Progress on goals from last visit: has cut out juice, cut down rice by half  - no new weight, but noticing body changing, feeling a bit lighter  Goals:  1. 0 noel beverages only- goal met  2. veg at lunch and dinner- goal somewhat met- only one meal  -reducing portions, states it's hard to eat  healthy  -struggling to plan meals    Dietary Recall:  Breakfast: boiled egg, bowl of cereal  Lunch:scrambled eggs  Dinner: burger  Nutrition Diagnosis:  Overweight/Obesity (NC 3.3) related to overeating and poor lifestyle habits as evidenced by patient report of large portions, intake of simple sugars and BMI 34.66    Intervention:  1. Food and/or nutrient delivery: encouraged patient to continue focusing on smaller portions rather than trying to make multiple meals for herself and the family  2. Nutrition counseling: take time for self- 5-10 a few times per week    Monitoring/Evaluation:    Goals:  1. Feels like she needs to take time for herself  2. Figure out a routine that works for her    Patient to follow up in 1 month(s) with bariatrician and 2 month(s) with RD        Phone call duration: 15 minutes    TARAH HASKINS RD        Again, thank you for allowing me to participate in the care of your patient.        Sincerely,        TARAH HASKINS RD

## 2022-08-18 NOTE — PROGRESS NOTES
Jacinta Aguayo is a 37 year old who is being evaluated via a billable telephone visit.      What phone number would you like to be contacted at? [phone]  How would you like to obtain your AVS? [facility]  Weight Loss Follow-Up Diet Evaluation  Assessment:  Jacinta is presenting today for a follow up weight management nutrition consultation. Pt has had an initial appointment with Dr. Vo  Weight loss medication: Phentermine and metformin- forgets to take evening dose  Initial weight: 189.5lb  Weight change: no new weight per patient    BMI: 34.66  Ideal body weight: 50.1 kg (110 lb 7.2 oz)  Adjusted ideal body weight: 64.4 kg (142 lb 1.1 oz)    Estimated RMR ([place]-St Jeor equation):   1495 kcals x 1.2 (sedentary) = 1794 kcals (for weight maintenance)  Recommended Protein Intake: 66-88 grams of protein/day  Patient Active Problem List:  Patient Active Problem List   Diagnosis     Infertility associated with anovulation     Complex endometrial hyperplasia with atypia     Cervical cancer screening     Polycystic ovary syndrome     Obesity (BMI 30-39.9)     Menstrual disorder     Acanthosis nigricans     Foot pain     Insulin resistance     Pre-diabetes     Dyslipidemia     Metabolic syndrome X      contractions     Encounter for triage in pregnant patient      premature rupture of membranes (PPROM) with unknown onset of labor     Progress on goals from last visit: has cut out juice, cut down rice by half  - no new weight, but noticing body changing, feeling a bit lighter  Goals:  1. 0 noel beverages only- goal met  2. veg at lunch and dinner- goal somewhat met- only one meal  -reducing portions, states it's hard to eat healthy  -struggling to plan meals    Dietary Recall:  Breakfast: boiled egg, bowl of cereal  Lunch:scrambled eggs  Dinner: burger  Nutrition Diagnosis:  Overweight/Obesity (NC 3.3) related to overeating and poor lifestyle habits as evidenced by patient report of large  portions, intake of simple sugars and BMI 34.66    Intervention:  1. Food and/or nutrient delivery: encouraged patient to continue focusing on smaller portions rather than trying to make multiple meals for herself and the family  2. Nutrition counseling: take time for self- 5-10 a few times per week    Monitoring/Evaluation:    Goals:  1. Feels like she needs to take time for herself  2. Figure out a routine that works for her    Patient to follow up in 1 month(s) with bariatrician and 2 month(s) with RD        Phone call duration: 15 minutes    TARAH HASKINS RD

## 2022-08-22 LAB
PATH REPORT.COMMENTS IMP SPEC: NORMAL
PATH REPORT.COMMENTS IMP SPEC: NORMAL
PATH REPORT.FINAL DX SPEC: NORMAL
PATH REPORT.GROSS SPEC: NORMAL
PATH REPORT.MICROSCOPIC SPEC OTHER STN: NORMAL
PATH REPORT.RELEVANT HX SPEC: NORMAL
PHOTO IMAGE: NORMAL

## 2022-09-14 ENCOUNTER — VIRTUAL VISIT (OUTPATIENT)
Dept: SURGERY | Facility: CLINIC | Age: 37
End: 2022-09-14
Payer: COMMERCIAL

## 2022-09-14 VITALS — HEIGHT: 62 IN | WEIGHT: 175 LBS | BODY MASS INDEX: 32.2 KG/M2

## 2022-09-14 DIAGNOSIS — E88.810 METABOLIC SYNDROME: ICD-10-CM

## 2022-09-14 DIAGNOSIS — E28.2 POLYCYSTIC OVARY SYNDROME: ICD-10-CM

## 2022-09-14 DIAGNOSIS — E66.9 OBESITY (BMI 30-39.9): Primary | ICD-10-CM

## 2022-09-14 PROCEDURE — 99214 OFFICE O/P EST MOD 30 MIN: CPT | Mod: GT | Performed by: FAMILY MEDICINE

## 2022-09-14 NOTE — PROGRESS NOTES
Jacinta Aguayo is 37 year old  female who presents for a billable video visit today.    How would you like to obtain your AVS? MyChart  If dropped from the video visit, the video invitation should be resent by: Text to cell phone: [phone]  Will anyone else be joining your video visit? No      Video Start Time: 1:30    Are there any specific questions or needs that you would like addressed at your visit today? Return MWM - struggling with eating habits but otherwise doing well    Bariatric Follow-up    37 year old  female BMI:Body mass index is 32.01 kg/m .    Weight:   Wt Readings from Last 1 Encounters:   22 79.4 kg (175 lb)    pounds    Comorbidities:  Patient Active Problem List   Diagnosis     Infertility associated with anovulation     Complex endometrial hyperplasia with atypia     Cervical cancer screening     Polycystic ovary syndrome     Obesity (BMI 30-39.9)     Menstrual disorder     Acanthosis nigricans     Foot pain     Insulin resistance     Pre-diabetes     Dyslipidemia     Metabolic syndrome X      contractions     Encounter for triage in pregnant patient      premature rupture of membranes (PPROM) with unknown onset of labor         Interim: 14# down from the 189# she returned her at after the twins. Has met with Lindsay.  Feels her proteins could be more lean. Her acanthosis nigricans has faded. She feels her habits could be better. Babies still waking up every 2 hours through the night to eat. Working weekends and getting more steps on those days. Walking around the block with the twins.     Plan:  DIET  Aim for 3 meals, protein first   EXERCISE continue to walk daily with the kids   PHARMACOTHERAPY continue metformin and phentermine    Think of a winter walking plan     -We reviewed her medications and whether associated with weight gain.    Current Outpatient Medications:      medroxyPROGESTERone (PROVERA) 10 MG tablet, , Disp: , Rfl:      metFORMIN (GLUCOPHAGE XR)  500 MG 24 hr tablet, Take 1 tablet (500 mg) by mouth 2 times daily (with meals), Disp: 180 tablet, Rfl: 3     phentermine (ADIPEX-P) 37.5 MG tablet, Take 0.5-1 tablets (18.75-37.5 mg) by mouth every morning (before breakfast) for 90 days, Disp: 90 tablet, Rfl: 0     vitamin D3 (CHOLECALCIFEROL) 50 mcg (2000 units) tablet, Take 1 tablet by mouth daily, Disp: , Rfl:       We discussed [facility] Bariatric Basics including:  -eating 3 meals daily  -eating protein first  -eating slowly, chewing food well  -avoiding/limiting calorie containing beverages  -choosing wheat, not white with breads, crackers, pastas, jennifer, bagels, tortillas, rice  -limiting restaurant or cafeteria eating to twice a week or less  -We discussed the importance of restorative sleep and stress management in maintaining a healthy weight.  -We discussed insulin resistance and glycemic index as it relates to appetite and weight control  -We discussed the National Weight Control Registry healthy weight maintenance strategies and ways to optimize metabolism.  -We discussed the importance of physical activity including cardiovascular and strength training in maintaining a healthier weight and explored viable options.    Most recent labs:  Lab Results   Component Value Date    WBC 16.2 (H) 05/10/2022    HGB 8.4 (L) 05/10/2022    HCT 27.2 (L) 05/10/2022    MCV 84 05/10/2022     05/10/2022     Lab Results   Component Value Date    CHOL 248 (H) 09/06/2019     Lab Results   Component Value Date    HDL 64 09/06/2019     No components found for: LDLCALC  Lab Results   Component Value Date    TRIG 130 09/06/2019       Lab Results   Component Value Date    TSH 0.44 05/31/2022       DIETARY HISTORY  Meals Per Day: 2  Eating Protein First?: yes  Food Diary: B:skips L:stir martinez, soups,  D:stir martinez, soups  Snacks Per Day: before bed-veggies from her sister's garden, fruit  Typical Snack: apple, watermelon, cucumbers  Fluid Intake: intentional  Portion Control:  "improved  Calorie Containing Beverages: no juice  Alcohol per week: no  Typical Protein Food Choices: variety  Choosing Whole Grains: yes  Grocery Shopping is done by: herself  Food Preparation is done by: [employer]  Meals at Restaurant/Cafeteria/Take Out Per Week: none  Eating at the Table: yes  TV is Off During Meals: yes    Positive Changes Since Last Visit:   Struggling With:     Knowledgeable in Reading Food Labels: yes  Getting Adequate Protein: yes  Sleeping 7-8 hours/day babies are up every 2 hours  Stress management     PHYSICAL ACTIVITY PATTERNS:  Cardiovascular: walking  Strength Training: no    REVIEW OF SYSTEMS  GENERAL/CONSTITUTIONAL:  Fatigue: yes  CARDIOVASCULAR:  Chest Pain with Exertion: no  PULMONARY:  Dyspnea on exertion: no  CPAP Use: no  Tobacco Use: no  GASTROINTESTINAL:  GERD/Heartburn:   UROLOGIC:  Urinary Symptoms:   NEUROLOGIC:  Headaches: no  Paresthesias:   PSYCHIATRIC:  Moods: euthymic  MUSCULOSKELETAL/RHEUMATOLOGIC  Arthralgias: no  Myalgias: no  ENDOCRINE:  Monitoring Blood Sugars: no  Sugars Well Controlled: yes  DERMATOLOGIC:  Rashes:     PHYSICAL EXAM: (most recent vitals and today's stated weight)  Vitals: Ht 1.575 m (5' 2\")   Wt 79.4 kg (175 lb)   BMI 32.01 kg/m        GEN: Pleasant and in no acute distress  PSYCH: A&OX3,     I have reviewed the note as documented above.  This accurately captures the substance of my conversation with the patient.  Thank you for the opportunity to participate in the care of your patient.    Ophelia Vo MD, FAAFP  [facility]  Diplomate, American Board of Obesity Medicine    Total time spent on the date of this encounter doing: chart review, review of test results, patient visit, physical exam, education, counseling, developing plan of care, and documenting = 30 minutes.          Video-Visit Details    Type of service:  Video Visit    Video End Time (time video stopped): 2:00  Originating Location (pt. Location): " Home    Distant Location (provider location):  [facility] SURGERY CLINIC AND BARIATRICS CARE [place]     Platform used for Video Visit: Suzette

## 2022-09-14 NOTE — LETTER
2022         RE: Jacinta Aguayo  [address]  [facility] [address]        Dear Colleague,    Thank you for referring your patient, Jacinta Aguayo, to the [facility] [place]. Please see a copy of my visit note below.    Jacinat Aguayo is 37 year old  female who presents for a billable video visit today.    How would you like to obtain your AVS? MyChart  If dropped from the video visit, the video invitation should be resent by: Text to cell phone: [phone]  Will anyone else be joining your video visit? No      Video Start Time: 1:30    Are there any specific questions or needs that you would like addressed at your visit today? Return MWM - struggling with eating habits but otherwise doing well    Bariatric Follow-up    37 year old  female BMI:Body mass index is 32.01 kg/m .    Weight:   Wt Readings from Last 1 Encounters:   22 79.4 kg (175 lb)    pounds    Comorbidities:  Patient Active Problem List   Diagnosis     Infertility associated with anovulation     Complex endometrial hyperplasia with atypia     Cervical cancer screening     Polycystic ovary syndrome     Obesity (BMI 30-39.9)     Menstrual disorder     Acanthosis nigricans     Foot pain     Insulin resistance     Pre-diabetes     Dyslipidemia     Metabolic syndrome X      contractions     Encounter for triage in pregnant patient      premature rupture of membranes (PPROM) with unknown onset of labor         Interim: 14# down from the 189# she returned her at after the twins. Has met with Lindsay.  Feels her proteins could be more lean. Her acanthosis nigricans has faded. She feels her habits could be better. Babies still waking up every 2 hours through the night to eat. Working weekends and getting more steps on those days. Walking around the block with the twins.     Plan:  DIET  Aim for 3 meals, protein first   EXERCISE continue to walk daily with the kids   PHARMACOTHERAPY continue  metformin and phentermine    Think of a winter walking plan     -We reviewed her medications and whether associated with weight gain.    Current Outpatient Medications:      medroxyPROGESTERone (PROVERA) 10 MG tablet, , Disp: , Rfl:      metFORMIN (GLUCOPHAGE XR) 500 MG 24 hr tablet, Take 1 tablet (500 mg) by mouth 2 times daily (with meals), Disp: 180 tablet, Rfl: 3     phentermine (ADIPEX-P) 37.5 MG tablet, Take 0.5-1 tablets (18.75-37.5 mg) by mouth every morning (before breakfast) for 90 days, Disp: 90 tablet, Rfl: 0     vitamin D3 (CHOLECALCIFEROL) 50 mcg (2000 units) tablet, Take 1 tablet by mouth daily, Disp: , Rfl:       We discussed [facility] Bariatric Basics including:  -eating 3 meals daily  -eating protein first  -eating slowly, chewing food well  -avoiding/limiting calorie containing beverages  -choosing wheat, not white with breads, crackers, pastas, jennifer, bagels, tortillas, rice  -limiting restaurant or cafeteria eating to twice a week or less  -We discussed the importance of restorative sleep and stress management in maintaining a healthy weight.  -We discussed insulin resistance and glycemic index as it relates to appetite and weight control  -We discussed the National Weight Control Registry healthy weight maintenance strategies and ways to optimize metabolism.  -We discussed the importance of physical activity including cardiovascular and strength training in maintaining a healthier weight and explored viable options.    Most recent labs:  Lab Results   Component Value Date    WBC 16.2 (H) 05/10/2022    HGB 8.4 (L) 05/10/2022    HCT 27.2 (L) 05/10/2022    MCV 84 05/10/2022     05/10/2022     Lab Results   Component Value Date    CHOL 248 (H) 09/06/2019     Lab Results   Component Value Date    HDL 64 09/06/2019     No components found for: LDLCALC  Lab Results   Component Value Date    TRIG 130 09/06/2019       Lab Results   Component Value Date    TSH 0.44 05/31/2022       DIETARY  "HISTORY  Meals Per Day: 2  Eating Protein First?: yes  Food Diary: B:skips L:stir martinez, soups,  D:stir martinez, soups  Snacks Per Day: before bed-veggies from her sister's garden, fruit  Typical Snack: apple, watermelon, cucumbers  Fluid Intake: intentional  Portion Control: improved  Calorie Containing Beverages: no juice  Alcohol per week: no  Typical Protein Food Choices: variety  Choosing Whole Grains: yes  Grocery Shopping is done by: herself  Food Preparation is done by: [employer]slf  Meals at Restaurant/Cafeteria/Take Out Per Week: none  Eating at the Table: yes  TV is Off During Meals: yes    Positive Changes Since Last Visit:   Struggling With:     Knowledgeable in Reading Food Labels: yes  Getting Adequate Protein: yes  Sleeping 7-8 hours/day babies are up every 2 hours  Stress management     PHYSICAL ACTIVITY PATTERNS:  Cardiovascular: walking  Strength Training: no    REVIEW OF SYSTEMS  GENERAL/CONSTITUTIONAL:  Fatigue: yes  CARDIOVASCULAR:  Chest Pain with Exertion: no  PULMONARY:  Dyspnea on exertion: no  CPAP Use: no  Tobacco Use: no  GASTROINTESTINAL:  GERD/Heartburn:   UROLOGIC:  Urinary Symptoms:   NEUROLOGIC:  Headaches: no  Paresthesias:   PSYCHIATRIC:  Moods: euthymic  MUSCULOSKELETAL/RHEUMATOLOGIC  Arthralgias: no  Myalgias: no  ENDOCRINE:  Monitoring Blood Sugars: no  Sugars Well Controlled: yes  DERMATOLOGIC:  Rashes:     PHYSICAL EXAM: (most recent vitals and today's stated weight)  Vitals: Ht 1.575 m (5' 2\")   Wt 79.4 kg (175 lb)   BMI 32.01 kg/m        GEN: Pleasant and in no acute distress  PSYCH: A&OX3,     I have reviewed the note as documented above.  This accurately captures the substance of my conversation with the patient.  Thank you for the opportunity to participate in the care of your patient.    Ophelia Vo MD, FAAFP  [facility]  Diplomate, American Board of Obesity Medicine    Total time spent on the date of this encounter doing: chart review, review of test " results, patient visit, physical exam, education, counseling, developing plan of care, and documenting = 30 minutes.          Video-Visit Details    Type of service:  Video Visit    Video End Time (time video stopped): 2:00  Originating Location (pt. Location): Home    Distant Location (provider location):  [facility]RICS [facility] [place]     Platform used for Video Visit: Suzette      Again, thank you for allowing me to participate in the care of your patient.        Sincerely,        Ophelia Vo MD

## 2022-10-02 ENCOUNTER — HEALTH MAINTENANCE LETTER (OUTPATIENT)
Age: 37
End: 2022-10-02

## 2022-10-14 ENCOUNTER — TELEPHONE (OUTPATIENT)
Dept: SURGERY | Facility: CLINIC | Age: 37
End: 2022-10-14

## 2022-10-14 NOTE — TELEPHONE ENCOUNTER
Sent video link via text and attempted to call for video visit at 11:00. Unable to leave a message at this time as mailbox is full.

## 2023-08-12 ENCOUNTER — HEALTH MAINTENANCE LETTER (OUTPATIENT)
Age: 38
End: 2023-08-12

## 2024-03-09 ENCOUNTER — HEALTH MAINTENANCE LETTER (OUTPATIENT)
Age: 39
End: 2024-03-09

## 2024-10-05 ENCOUNTER — HEALTH MAINTENANCE LETTER (OUTPATIENT)
Age: 39
End: 2024-10-05

## 2024-12-11 ENCOUNTER — TRANSCRIBE ORDERS (OUTPATIENT)
Dept: OTHER | Age: 39
End: 2024-12-11

## 2024-12-11 ENCOUNTER — PRE VISIT (OUTPATIENT)
Dept: ONCOLOGY | Facility: CLINIC | Age: 39
End: 2024-12-11
Payer: COMMERCIAL

## 2024-12-11 ENCOUNTER — PRE VISIT (OUTPATIENT)
Dept: ONCOLOGY | Facility: CLINIC | Age: 39
End: 2024-12-11
Payer: MEDICAID

## 2024-12-11 ENCOUNTER — PATIENT OUTREACH (OUTPATIENT)
Dept: ONCOLOGY | Facility: CLINIC | Age: 39
End: 2024-12-11
Payer: COMMERCIAL

## 2024-12-11 DIAGNOSIS — N85.02 ENDOMETRIAL HYPERPLASIA WITH ATYPIA: Primary | ICD-10-CM

## 2024-12-11 NOTE — PROGRESS NOTES
Patient called in to re-establish with Dr Hassan, last seen 11/2020 at [facility] with Dr Hassan, plan per below.        She gave birth in 2022 but does not appear to have followed with Oncology for prior endometrial hyperplasia with atypia and requesting to re-establish with Dr Hassan.

## 2024-12-11 NOTE — TELEPHONE ENCOUNTER
Action December 11, 2024 12:35 PM    Action Taken Warm transfer from Megan SERVIN.     - Patient was on the call, duplicate chart under [id number] Bryanna Tomas. All records are under the last name MARILEE. Chart is marked for merged.    - Patient was seen at [facility] with Dr. Hassan and would like to come back to see her. No other provider or follow up with gyn onc after Dr. Hassan. All images and biopsies were done at .   - Images are requested from .    - Patient confirms no hx of radiation therapy, genetic testing, and chemo.        RECORDS STATUS - ALL OTHER DIAGNOSIS      RECORDS RECEIVED FROM:    NOTES STATUS DETAILS   OFFICE NOTE from referring provider External Self-Referred   OFFICE NOTE from medical oncologist [facility] 2/24/21: Dr. Eufemia Hassan   OFFICE NOTE from other specialist     DISCHARGE SUMMARY from hospital     DISCHARGE REPORT from the ER     OPERATIVE REPORT -  3/26/21: Endometrium, endometrial currettings     10/20/0: Uterus, endometrial currettings   7/9/20:  Endometrium, curettage   5/29/19:  Endometrium, biopsy   2/27/19:  Endometrium, biopsy   10/29/18: Endometrium, curettage    MEDICATION LIST [facility]    LABS     PATHOLOGY REPORTS     ANYTHING RELATED TO DIAGNOSIS CE-  Most recent 5/31/22   PATHOLOGY FEDEX TRACKING   Tracking #:   GENONOMIC TESTING     TYPE:     IMAGING (NEED IMAGES & REPORT)     CT SCANS     MRI     XRAYS     ULTRASOUND     PET     IMAGE DISC FEDEX TRACKING   Tracking #:

## 2024-12-11 NOTE — TELEPHONE ENCOUNTER
Action December 11, 2024 12:36 PM    Action Taken To resolve CE - HP. PT called in, self-referred. Patient goes by the last name Kali.    Please see chart [id number] Bryanna Bass.

## 2025-01-06 PROBLEM — N85.02 EIN (ENDOMETRIAL INTRAEPITHELIAL NEOPLASIA): Status: ACTIVE | Noted: 2018-11-01

## 2025-01-06 NOTE — PATIENT INSTRUCTIONS
SCHEDULING:  RTC in 5 years for IUD replacement and cervical cancer screening.     DIAGNOSIS:   Endometrial intraepithelial neoplasia.   Treatment History:  -11/5/18-5/2019: Hormonal therapy with megestrol acetate.   -7/2020-11/2020: Hormonal therapy with megestrol acetate.  -11/23/20-5/7/21: Hormonal therapy with progestin IUD (mirena).         PLAN:  1) EIN: We will notify you of the endometrial biopsy pathology results via MyChart & telephone.  -If EIN or cancer: RTC to discuss hysterectomy.   -If hyperplasia without atypia: Continue therapy with the IUD and RTC in 6 months for endometrial sampling, or sooner if you have abnormal uterine bleeding.   -If negative: Continue risk-reducing progestin therapy with the IUD and RTC in 5 years for IUD replacement, or sooner if you have abnormal uterine bleeding.    Please call if you decide to proceed with hysterectomy (removal of uterus/cervix), bilateral salpingectomy (removal of bilateral fallopian tubes).     2) Cervical cancer screening: We will notify you of results and follow-up plan via MyChart & telephone.  -If HPV test negative: RTC in 5 years for repeat screening.       Please call with any questions or concerns. [phone]        Eufemia Hassan MD, MS, FACOG, FACS  1/7/2025  4:44 PM

## 2025-01-06 NOTE — PROGRESS NOTES
Consult Note  Gynecologic Oncology Clinic      Self-referred      Patient: Bryanna Tomas  : 1985  Language: English   Pronouns: she/her/hers       Date of Visit: 2025       Reason for visit: Endometrial intraepithelial neoplasia.       History of Present Illness:  Bryanna Tomas is a 39 year old patient referred to the gyn onc clinic for management of endometrial intraepithelial neoplasia. Surgical history significant for classical  section for  twin birth. History as follows:    10/29/18: Hysteroscopy, direct endometrial biopsy using myosure by Dr. Raza Tinsley.   -Pathology: Endometrial intraepithelial neopalsia (EIN) with morular metaplasia arising in a background of disordered proliferative endometrium and focal endometrial polyp.     18: Initiation of hormonal therapy with megestrol acetate.   -40 mg daily 18-18.   -80 mg BID starting 18.   -19: Endometrial biopsy: Small foci of simple hyperplasia without atypia.   -19: Endometrial biopsy: Benign endometrium with exogenous hormone therapy effect.   -2019: Hormone therapy discontinued.   -20: Endometrial biopsy: Focal EIN.   -2020: Restarted hormone therapy with megestrol acetate 80 mg BID.   -10/20/20: D&C pathology: Focal hyperplasia without atypia.   20: Discontinuation of megestrol acetate, placement of progestin IUD.   3/26/21: Pelvic exam under anesthesia, D&C, replacement of progestin IUD.   -Pathology: Inactive endometrium with exogenous hormone effect.   21: IUD removed.      Subjective:  Bryanna Tomas presents to the gyn onc clinic today for a scheduled consultation, unaccompanied.  Bryanna had di/di twins delivered at 27 weeks by classical  section in . Bryanna has not been on any progestin therapy since that time.   Bryanna reports she is amenorrheic.   Bryanna states there is no chance she could be pregnant.    Fertility desires: Does not desire future fertility.        Medical History:  Past Medical History:   Diagnosis Date    EIN (endometrial intraepithelial neoplasia) 2018    PCOS (polycystic ovarian syndrome)          Surgical History:  Past Surgical History:   Procedure Laterality Date     SECTION N/A 2022    Procedure:  SECTION;  Surgeon: Saadia Larsen MD;  Location:  L+D          Gynecologic History:  Menstrual/Menopause status: Secondary amenorrhea.   Hormone therapy/Contraception status: None.  History of abnormal cervical cancer screening:  None.  History of STIS: None.  Current sexual activity status:  Currently sexually active.      Obstetric History:  OB History    Para Term  AB Living   1 1 0 1 0 2   SAB IAB Ectopic Multiple Live Births   0 0 0 1 2      # Outcome Date GA Lbr Kei/2nd Weight Sex Type Anes PTL Lv   1A  22 27w3d   M CS-Classical Spinal N MITRA      Name: MARILEE,MALE-A LAZARO      Apgar1: 5  Apgar5: 7   1B  22 27w3d   F CS-Classical  N MITRA      Name: MARILEEFEMALE-B LAZARO      Apgar1: 7  Apgar5: 8          Healthcare Maintenance:   Last cervical cancer screening: 10/24/17 Result: NILM, hrHPV-  Last breast cancer screening: None   Last colon cancer screening: Not indicated: Age <45-50 years   HPV vaccination status: Unvaccinated      Medications:   Current Outpatient Medications   Medication Sig Dispense Refill    medroxyPROGESTERone (PROVERA) 10 MG tablet       metFORMIN (GLUCOPHAGE XR) 500 MG 24 hr tablet Take 1 tablet (500 mg) by mouth 2 times daily (with meals) 180 tablet 3    vitamin D3 (CHOLECALCIFEROL) 50 mcg (2000 units) tablet Take 1 tablet by mouth daily       No current facility-administered medications for this visit.          Allergies:   No Known Allergies       Social History:  Patient lives with her  and 2 young kids.   Work status: Working weekends at the [employer]. Activity: No activity limitations.  Advanced Directives: None. Desired Healthcare Power of  ":  Gerson Tomas.   Social History     Tobacco Use    Smoking status: Former     Current packs/day: 0.00     Types: Cigarettes     Quit date: 2019     Years since quittin.5    Smokeless tobacco: Never   Substance Use Topics    Alcohol use: Not Currently    Drug use: Never          Family History:  No known family history of breast, ovarian, uterine, colon, urothelial/renal, prostate or pancreatic cancers.  No known family history of melanoma.   No family history on file.      Physical Exam:   /75 (BP Location: Right arm, Patient Position: Sitting, Cuff Size: Adult Large)   Pulse 81   Temp 97.9  F (36.6  C) (Oral)   Resp 20   Ht 1.593 m (5' 2.72\")   Wt 94.4 kg (208 lb 1.6 oz)   SpO2 97%   BMI 37.20 kg/m    Body mass index is 37.2 kg/m .     General Appearance: healthy and alert, no distress     Musculoskeletal: extremities non tender and without edema    Skin: no lesions or rashes     Neurological:  no gross defects     Psychiatric: appropriate mood and affect                               Genitourinary: External genitalia and urethral meatus appears normal. Cervix and vagina are grossly normal.   HPV test specimen obtained.   Verbal consent obtained from the patient for the pelvic exam.  Each step of the exam was verbalized throughout.   Present for the exam: YANDEL Billings.       Procedure Risk Statement:   The patient was counseled regarding risks, benefits and alternatives to endometrial biopsy, IUD placement..  Risks discussed include but not limited to:  Bleeding; infection; injury to adjacent organs; inadequate sample or false negative result.  The patient's questions were answered, and informed consent signed.       Procedure:   Endometrial biopsy:  After patient identification was confirmed and informed consent was signed, a bivalve speculum was placed in the vagina for adequate visualization of the cervix.  The cervix and vagina were prepped with betadine.  The cervix was " grasped with a single-tooth tenaculum to apply traction.  Os finder used to genly dilate cervical os. Endometrial biopsy was obtained with 1 passes, with small amount of tissue obtained.      IUD placement:  The tenaculum was used for traction. The IUD was placed using the provided insertion device in the usual fashion. The strings were trimmed. The tenaculum was then removed, and the tenaculum site was made hemostatic with application of pressure and silver nitrate.       Laboratory Examination:  Urine pregnancy test: Declined (patient states there is no chance of current pregnancy).   I have independently reviewed & interpreted the pathology results detailed in the HPI.       Radiographic Examination:  NA      Performance Status:  ECOG Grade 0.       Assessment:  Bryanna Tomas is a 39 year old patient with a diagnosis of EIN, last diagnosed . Currently amenorrheic.  Endometrial biopsy pathology pending.     Due for cervical cancer screening.     Surgical history significant for classical  section for  delivery of twins.     Fertility desires: Does not desire future fertility.        Plan:   1) EIN: Bryanna has not had any progestin or hormone therapy since discontinuation prior to pregnancy, and has been amenorrheic since her delivery. Discussed management options, including definitive hysterectomy vs progestin therapy to decrease risk of recurrent EIN or cancer in the future, even if biopsy pathology negative at this time.    After discussion of risks/benefits of all options, Bryanna is considering a hysterectomy, but would like to think about it more.   Plan as follows:  -If biopsy pathology EIN/cancer: RTC to discuss hysterectomy.   -If biopsy pathology hyperplasia without atypia: Continue progestin therapy with IUD and repeat endometrial sampling in 6 months, or sooner if there is any abnormal bleeding.  -If biopsy pathology normal: Continue risk-reducing therapy with progestin IUD with  follow-up in 5 years for IUD replacement, or sooner if she has any abnormal uterine bleeding.   -Bryanna will call in the interim if she decides to proceed with hysterectomy, bilateral salpingectomy irrespective of current pathology.     2) Genetic risk factors assessed: Does not meet criteria for Genetics referral.     3) Labs/tests ordered: 1) Primary HPV test with reflex cytology. 2) Surgical pathology: Endometrial biopsy.   Will notify patient of results via MyChart & telephone (okay to leave a detailed message).     4) Health maintenance issues addressed today: Cervical cancer screening--management per ASCCP guidelines.     5) Code status: Full-code.     6) Prescriptions: None.      A total of 45 minutes was spent with the patient, 30 minutes of which were spent in counseling/treatment planning, 10 minutes of which were spent in procedure time; an additional 10 minutes was spent in chart review (including review of labs) and documentation.         Eufemia Hassan MD, MS, FACOG, FACS  1/7/2025  4:40 PM

## 2025-01-07 ENCOUNTER — ONCOLOGY VISIT (OUTPATIENT)
Dept: ONCOLOGY | Facility: CLINIC | Age: 40
End: 2025-01-07
Attending: OBSTETRICS & GYNECOLOGY
Payer: MEDICAID

## 2025-01-07 VITALS
BODY MASS INDEX: 36.87 KG/M2 | HEIGHT: 63 IN | OXYGEN SATURATION: 97 % | RESPIRATION RATE: 20 BRPM | SYSTOLIC BLOOD PRESSURE: 123 MMHG | DIASTOLIC BLOOD PRESSURE: 75 MMHG | WEIGHT: 208.1 LBS | HEART RATE: 81 BPM | TEMPERATURE: 97.9 F

## 2025-01-07 DIAGNOSIS — Z30.430 ENCOUNTER FOR INSERTION OF INTRAUTERINE CONTRACEPTIVE DEVICE: ICD-10-CM

## 2025-01-07 DIAGNOSIS — N85.02 ENDOMETRIAL HYPERPLASIA WITH ATYPIA: ICD-10-CM

## 2025-01-07 DIAGNOSIS — Z12.4 ENCOUNTER FOR SCREENING FOR CERVICAL CANCER: ICD-10-CM

## 2025-01-07 DIAGNOSIS — Z30.014 ENCOUNTER FOR INITIAL PRESCRIPTION OF INTRAUTERINE CONTRACEPTIVE DEVICE (IUD): ICD-10-CM

## 2025-01-07 DIAGNOSIS — N85.02 EIN (ENDOMETRIAL INTRAEPITHELIAL NEOPLASIA): Primary | ICD-10-CM

## 2025-01-07 PROBLEM — Z36.89 ENCOUNTER FOR TRIAGE IN PREGNANT PATIENT: Status: RESOLVED | Noted: 2022-05-08 | Resolved: 2025-01-07

## 2025-01-07 PROCEDURE — 99204 OFFICE O/P NEW MOD 45 MIN: CPT | Mod: 25 | Performed by: OBSTETRICS & GYNECOLOGY

## 2025-01-07 PROCEDURE — G0463 HOSPITAL OUTPT CLINIC VISIT: HCPCS | Performed by: OBSTETRICS & GYNECOLOGY

## 2025-01-07 PROCEDURE — 88305 TISSUE EXAM BY PATHOLOGIST: CPT | Mod: 26 | Performed by: STUDENT IN AN ORGANIZED HEALTH CARE EDUCATION/TRAINING PROGRAM

## 2025-01-07 PROCEDURE — 58100 BIOPSY OF UTERUS LINING: CPT | Performed by: OBSTETRICS & GYNECOLOGY

## 2025-01-07 PROCEDURE — 88305 TISSUE EXAM BY PATHOLOGIST: CPT | Mod: TC | Performed by: OBSTETRICS & GYNECOLOGY

## 2025-01-07 PROCEDURE — 87624 HPV HI-RISK TYP POOLED RSLT: CPT | Performed by: OBSTETRICS & GYNECOLOGY

## 2025-01-07 PROCEDURE — 58300 INSERT INTRAUTERINE DEVICE: CPT | Performed by: OBSTETRICS & GYNECOLOGY

## 2025-01-07 ASSESSMENT — PAIN SCALES - GENERAL: PAINLEVEL_OUTOF10: NO PAIN (0)

## 2025-01-07 NOTE — Clinical Note
2025      Bryanna Tomas  [address]  [facility] [address]      Dear Colleague,    Thank you for referring your patient, Bryanna Tomas, to the [facility]. Please see a copy of my visit note below.    Consult Note  Gynecologic Oncology Clinic      Self-referred      ***Other key providers      Patient: Bryanna Tomas  : 1985  Language: English   Pronouns: she/her/hers       Date of Visit: 2025       Reason for visit: Endometrial intraepithelial neoplasia.       History of Present Illness:  Bryanna Tomas is a 39 year old patient referred to the gyn onc clinic for management of endometrial intraepithelial neoplasia. Medical history significant for ***. Surgical history significant for classical  section for  twin birth. History as follows:    10/29/18: Hysteroscopy, direct endometrial biopsy using myosure by Dr. Raza Tinsley.   -Pathology: Endometrial intraepithelial neopalsia (EIN) with morular metaplasia arising in a background of disordered proliferative endometrium and focal endometrial polyp.     18: Initiation of hormonal therapy with megestrol acetate.   -40 mg daily 18-18.   -80 mg BID starting 18.   -19: Endometrial biopsy: Small foci of simple hyperplasia without atypia.   -19: Endometrial biopsy: Benign endometrium with exogenous hormone therapy effect.   -2019: Hormone therapy discontinued.   -20: Endometrial biopsy: Focal EIN.   -2020: Restarted hormone therapy with megestrol acetate 80 mg BID.   -10/20/20: D&C pathology: Focal hyperplasia without atypia.   20: Discontinuation of megestrol acetate, placement of progestin IUD.   3/26/21: Pelvic exam under anesthesia, D&C, replacement of progestin IUD.   -Pathology: Inactive endometrium with exogenous hormone effect.   21: IUD removed.      Subjective:  Bryanna Tomas presents to the gyn onc clinic today for a scheduled consultation, accompanied  by ***  Bryanna had di/di twins delivered at 27 weeks by classical  section in . ***  ***Normal appetite. Normal bowel/bladder habits. No weight changes. No abnormal vaginal bleeding. No chest pain/shortness of breath. No lower extremity edema. No other rashes, unexplained bruises, notable lymphadenopathy.      Fertility desires: ***(if <=46yo)      Medical History:  Past Medical History:   Diagnosis Date     EIN (endometrial intraepithelial neoplasia)      PCOS (polycystic ovarian syndrome)          Surgical History:  Past Surgical History:   Procedure Laterality Date      SECTION N/A 2022    Procedure:  SECTION;  Surgeon: Saadia Larsen MD;  Location:  L+D          Gynecologic History:  Menstrual/Menopause status: ***  Hormone therapy/Contraception status: ***  History of abnormal cervical cancer screening:  ***  History of STIS: ***  Current sexual activity status:  ***      Obstetric History:  OB History    Para Term  AB Living   1 1 0 1 0 2   SAB IAB Ectopic Multiple Live Births   0 0 0 1 2      # Outcome Date GA Lbr Kei/2nd Weight Sex Type Anes PTL Lv   1A  22 27w3d   M CS-Classical Spinal N MITRA      Name: MARILEEMALE-A BRYANNA      Apgar1: 5  Apgar5: 7   1B  22 27w3d   F CS-Classical  N MITRA      Name: MARILEEFEMALE-B BRYANNA      Apgar1: 7  Apgar5: 8          Healthcare Maintenance:   Last cervical cancer screening: ***10/24/17 Result: NILM, hrHPV-  Last breast cancer screening: None   Last colon cancer screening: Not indicated: Age <45-50 years   HPV vaccination status: Unvaccinated      Medications:   Current Outpatient Medications   Medication Sig Dispense Refill     medroxyPROGESTERone (PROVERA) 10 MG tablet        metFORMIN (GLUCOPHAGE XR) 500 MG 24 hr tablet Take 1 tablet (500 mg) by mouth 2 times daily (with meals) 180 tablet 3     vitamin D3 (CHOLECALCIFEROL) 50 mcg (2000 units) tablet Take 1 tablet by mouth daily        No current facility-administered medications for this visit.          Allergies:   No Known Allergies       Social History:  Patient lives with ***.   Work status: ***. Activity: ***  Advanced Directives: *** Desired Healthcare Power of : ***  Social History     Tobacco Use     Smoking status: Former     Current packs/day: 0.00     Types: Cigarettes     Quit date: 2019     Years since quittin.5     Smokeless tobacco: Never   Substance Use Topics     Alcohol use: Not Currently     Drug use: Never          Family History:  Family history is significant for ***  No known family history of breast, ovarian, uterine, colon, urothelial/renal, prostate or pancreatic cancers.  No known family history of melanoma. ***  No family history on file.      Physical Exam: ***  There were no vitals taken for this visit.  There is no height or weight on file to calculate BMI.     General Appearance: healthy and alert, no distress     HEENT:  no thyromegaly, no palpable nodules or masses        Cardiovascular: regular rate and rhythm, no gallops, rubs or murmurs     Respiratory: lungs clear, no rales, rhonchi or wheezes, normal diaphragmatic excursion    Musculoskeletal: extremities non tender and without edema    Skin: no lesions or rashes     Neurological: normal gait, no gross defects     Psychiatric: appropriate mood and affect                               Hematological: normal cervical, supraclavicular and inguinal lymph nodes     Gastrointestinal:       abdomen soft, non-tender, non-distended, no organomegaly or masses    Genitourinary: External genitalia and urethral meatus appears normal.  Vagina is smooth without nodularity or masses.  Cervix appears normal and without lesions.  Bimanual exam reveal no masses, nodularity or fullness.  Recto-vaginal exam confirms these findings.   ***HPV test specimen obtained.   Verbal consent obtained from the patient for the pelvic exam.  Each step of the exam was  verbalized throughout.   Present for the exam: ***      Laboratory Examination:  I have independently reviewed & interpreted the pathology results detailed in the HPI.       Radiographic Examination:  NA      Performance Status:  ECOG Grade ***      Assessment:  Bryanna Tomas is a 39 year old patient with a diagnosis of EIN, last diagnosed . ***    Due for cervical cancer screening.     Medical history significant for ***  Surgical history significant for classical  section for  delivery of twins.     Fertility desires: *** (if <=46yo)      Plan:   1) EIN: ***    2) Genetic risk factors assessed: ***Does not meet critiera     3) Labs/tests ordered: ***  Will notify patient of results via ***    4) Health maintenance issues addressed today: ***    5) Code status: ***    6) Prescriptions: ***    7) Preoperative teaching was completed today.***  Risks of the surgery were discussed, including but not limited to:  Bleeding, possibly requiring a blood transfusion; injury to adjacent organs; postoperative complications (infection, possibly requiring antibiotics, drain placement, wound opening; blood clot and risk of pulmonary embolism); medical complications (pneumonia; stroke; heart attack; death); long-term complications (hernia; chronic pain); if lymphadenectomy performed, risk of lymphedema or lymphocele; if bowel surgery performed, possibility of temporary or permanent ostomy, risk of anastamotic leak, fistula, or need for longterm nutritional supplementation.***  In the case of bilateral salpingo-oophorectomy, the patient was counseled regarding the risks of surgical menopause, including hot flashes, vaginal dryness, decreased libido, increased osteoporosis risk, possibly increased dementia risk, possible increased cardiovascular risk.***  The patient was counseled that in the case of hysterectomy, they will no longer be able to become pregnant and that hysterectomy is associated with an increased  risk of pelvic organ prolapse and/or urinary incontinence.***  The patient was counseled that trainees such as fellows, residents and/or students may be involved in her case. Each team member will perform to his/her level of training, and may perform a pelvic exam under anesthesia.  The patient will receive antibiotic and VTE prophylaxis as indicated.       A total of *** minutes was spent with the patient, *** minutes of which were spent in counseling/treatment planning, *** minutes of which were spent in procedure time; an additional *** minutes was spent in chart review (including review of labs and radiographic images***) and documentation.         ***            Again, thank you for allowing me to participate in the care of your patient.        Sincerely,        Eufemia Hassan MD    Electronically signed

## 2025-01-07 NOTE — NURSING NOTE
"Oncology Rooming Note    January 7, 2025 2:03 PM   Bryanna Tomas is a 39 year old female who presents for:    Chief Complaint   Patient presents with    Oncology Clinic Visit     Endometrial intraepithelial neoplasia      Initial Vitals: /75 (BP Location: Right arm, Patient Position: Sitting, Cuff Size: Adult Large)   Pulse 81   Temp 97.9  F (36.6  C) (Oral)   Resp 20   Ht 1.593 m (5' 2.72\")   Wt 94.4 kg (208 lb 1.6 oz)   SpO2 97%   BMI 37.20 kg/m   Estimated body mass index is 37.2 kg/m  as calculated from the following:    Height as of this encounter: 1.593 m (5' 2.72\").    Weight as of this encounter: 94.4 kg (208 lb 1.6 oz). Body surface area is 2.04 meters squared.  No Pain (0) Comment: Data Unavailable   No LMP recorded. (Menstrual status: Irregular Periods).  Allergies reviewed: Yes  Medications reviewed: Yes    Medications: Medication refills not needed today.  Pharmacy name entered into [email or web address]: [id number] [address]  [facility]    Frailty Screening:   Is the patient here for a new oncology consult visit in cancer care? 1. Yes. Over the past month, have you experienced difficulty or required a caregiver to assist with:   1. Balance, walking or general mobility (including any falls)? NO  2. Completion of self-care tasks such as bathing, dressing, toileting, grooming/hygiene?  NO  3. Concentration or memory that affects your daily life?  NO       Clinical concerns: none      Eusebia Fernandes"

## 2025-01-08 LAB
HPV HR 12 DNA CVX QL NAA+PROBE: NEGATIVE
HPV16 DNA CVX QL NAA+PROBE: NEGATIVE
HPV18 DNA CVX QL NAA+PROBE: NEGATIVE
HUMAN PAPILLOMA VIRUS FINAL DIAGNOSIS: NORMAL

## 2025-01-08 NOTE — NURSING NOTE
Return appt in  5 years  Biopsies orders entered, specimen sent to pathology    pap smear done today, orders entered, specimen sent to cytology

## 2025-02-11 ENCOUNTER — PATIENT OUTREACH (OUTPATIENT)
Dept: ONCOLOGY | Facility: CLINIC | Age: 40
End: 2025-02-11
Payer: MEDICAID

## (undated) DEVICE — STRAP KNEE/BODY 31143004

## (undated) DEVICE — SOL NACL 0.9% IRRIG 1000ML BOTTLE 07138-09

## (undated) DEVICE — SOL WATER IRRIG 1000ML BOTTLE 07139-09

## (undated) DEVICE — PACK C-SECTION LF PL15OTA83B

## (undated) DEVICE — DRSG ABDOMINAL 07 1/2X8" 7197D

## (undated) DEVICE — STOCKING SLEEVE COMPRESSION CALF LG

## (undated) DEVICE — DRSG STERI STRIP 1/4X3" R1541

## (undated) DEVICE — PREP CHLORAPREP 26ML TINTED ORANGE  260815

## (undated) DEVICE — CATH TRAY FOLEY SURESTEP 16FR WDRAIN BAG STLK LATEX A300316A

## (undated) RX ORDER — ONDANSETRON 2 MG/ML
INJECTION INTRAMUSCULAR; INTRAVENOUS
Status: DISPENSED
Start: 2022-05-09

## (undated) RX ORDER — CEFAZOLIN SODIUM 1 G/3ML
INJECTION, POWDER, FOR SOLUTION INTRAMUSCULAR; INTRAVENOUS
Status: DISPENSED
Start: 2022-05-09

## (undated) RX ORDER — FENTANYL CITRATE 50 UG/ML
INJECTION, SOLUTION INTRAMUSCULAR; INTRAVENOUS
Status: DISPENSED
Start: 2022-05-09

## (undated) RX ORDER — MORPHINE SULFATE 1 MG/ML
INJECTION, SOLUTION EPIDURAL; INTRATHECAL; INTRAVENOUS
Status: DISPENSED
Start: 2022-05-09